# Patient Record
Sex: FEMALE | Employment: FULL TIME | URBAN - METROPOLITAN AREA
[De-identification: names, ages, dates, MRNs, and addresses within clinical notes are randomized per-mention and may not be internally consistent; named-entity substitution may affect disease eponyms.]

---

## 2019-06-11 ENCOUNTER — TELEPHONE (OUTPATIENT)
Dept: OBGYN CLINIC | Facility: CLINIC | Age: 29
End: 2019-06-11

## 2019-06-11 NOTE — TELEPHONE ENCOUNTER
Patient calling to initiate prenatal care  LMP 5/3/19  Patient is 7-8 weeks on 6/28/19  Confirmation Ultrasound and Appointment scheduled on     Future Appointments   Date Time Provider Manjeet Lui   7/2/2019  2:30 PM [facility] OB PFLD US [facility] OB/GYN P [facility]

## 2019-06-12 NOTE — TELEPHONE ENCOUNTER
Meds: PNV  PMH: None  PSH: None  Denies any questions/concerns. States she had post partum hemorrhage with 1st.  Denies any further questions or concerns.

## 2019-07-02 ENCOUNTER — ULTRASOUND ENCOUNTER (OUTPATIENT)
Dept: OBGYN CLINIC | Facility: CLINIC | Age: 29
End: 2019-07-02
Payer: COMMERCIAL

## 2019-07-02 ENCOUNTER — OFFICE VISIT (OUTPATIENT)
Dept: OBGYN CLINIC | Facility: CLINIC | Age: 29
End: 2019-07-02
Payer: COMMERCIAL

## 2019-07-02 VITALS
HEART RATE: 85 BPM | WEIGHT: 119.81 LBS | DIASTOLIC BLOOD PRESSURE: 66 MMHG | HEIGHT: 64 IN | BODY MASS INDEX: 20.45 KG/M2 | SYSTOLIC BLOOD PRESSURE: 108 MMHG

## 2019-07-02 DIAGNOSIS — N91.1 AMENORRHEA, SECONDARY: Primary | ICD-10-CM

## 2019-07-02 PROCEDURE — 76856 US EXAM PELVIC COMPLETE: CPT | Performed by: OBSTETRICS & GYNECOLOGY

## 2019-07-02 PROCEDURE — 99213 OFFICE O/P EST LOW 20 MIN: CPT | Performed by: OBSTETRICS & GYNECOLOGY

## 2019-07-02 NOTE — PROGRESS NOTES
Here for amenorrhea secondary to positive pregnancy test  Pt had ultrasound today showing single live IUP  Pt having N/V    PTL around 20 weeks. Was using vaginal progesterone. Delivered FT     Patient's last menstrual period was 05/04/2019. Lisset Quiñones      Last pap

## 2019-07-30 NOTE — PROGRESS NOTES
[facility]  Obstetrics and Gynecology  History & Physical    CC: Patient is here to establish prenatal care     Subjective:     HPI:  Joane Krabbe is a 34year old  female at 12w4d who presents today to establish prenatal care.  Patient rep History   Problem Relation Age of Onset   • Heart Disorder Maternal Grandmother    • Hypertension Maternal Grandmother    • Heart Disorder Maternal Grandfather    • Hypertension Maternal Grandfather    • Diabetes Paternal Grandmother    • Hypertension [place] closed. No bleeding noted. 4) Uterus: Anteverted. Mobile. 12 week gestational size. 5) Adnexa/parametria: Non tender adnexa. No masses. Fetal Well Being Assessment:   BPM. Putnam, live IUP.      Assessment/Plan:     Lorna Albright is a 34 ye

## 2019-07-30 NOTE — PATIENT INSTRUCTIONS
Genetic screening     1) First trimester screening at 13 weeks includes blood test and ultrasound. You will need to schedule an appointment with maternal fetal medicine office. Therefore, you will need time to make appointment.      2) cell free fetal DNA ( Headache or Mild aches and pain: Extra Strength Tylenol     Gas: Gas X, Gelusil, Papaya Tablets, Maalox, Mylicon or Mylanta Gas    Heartburn: Tums, Mylanta, Pepcid AC, Zantac 75 or Maalox    Sore throat: Alcohol free Chloraseptic spray or Lozenges     Saniaus

## 2019-07-31 ENCOUNTER — INITIAL PRENATAL (OUTPATIENT)
Dept: OBGYN CLINIC | Facility: CLINIC | Age: 29
End: 2019-07-31
Payer: COMMERCIAL

## 2019-07-31 ENCOUNTER — LAB ENCOUNTER (OUTPATIENT)
Dept: LAB | Age: 29
End: 2019-07-31
Attending: OBSTETRICS & GYNECOLOGY
Payer: COMMERCIAL

## 2019-07-31 VITALS
DIASTOLIC BLOOD PRESSURE: 70 MMHG | SYSTOLIC BLOOD PRESSURE: 108 MMHG | BODY MASS INDEX: 20.55 KG/M2 | HEART RATE: 78 BPM | HEIGHT: 64 IN | WEIGHT: 120.38 LBS

## 2019-07-31 DIAGNOSIS — Z13.79 GENETIC SCREENING: ICD-10-CM

## 2019-07-31 DIAGNOSIS — Z34.91 ENCOUNTER FOR SUPERVISION OF NORMAL PREGNANCY IN FIRST TRIMESTER, UNSPECIFIED GRAVIDITY: Primary | ICD-10-CM

## 2019-07-31 DIAGNOSIS — Z36.9 PRENATAL SCREENING ENCOUNTER: ICD-10-CM

## 2019-07-31 DIAGNOSIS — Z12.4 CERVICAL CANCER SCREENING: ICD-10-CM

## 2019-07-31 DIAGNOSIS — Z34.91 ENCOUNTER FOR SUPERVISION OF NORMAL PREGNANCY IN FIRST TRIMESTER, UNSPECIFIED GRAVIDITY: ICD-10-CM

## 2019-07-31 LAB
ANTIBODY SCREEN: NEGATIVE
BASOPHILS # BLD AUTO: 0.04 X10(3) UL (ref 0–0.2)
BASOPHILS NFR BLD AUTO: 0.5 %
DEPRECATED RDW RBC AUTO: 42.5 FL (ref 35.1–46.3)
EOSINOPHIL # BLD AUTO: 0.06 X10(3) UL (ref 0–0.7)
EOSINOPHIL NFR BLD AUTO: 0.7 %
ERYTHROCYTE [DISTWIDTH] IN BLOOD BY AUTOMATED COUNT: 13.9 % (ref 11–15)
HBV SURFACE AG SER-ACNC: 0.2 [IU]/L
HBV SURFACE AG SERPL QL IA: NONREACTIVE
HCT VFR BLD AUTO: 37.7 % (ref 35–48)
HGB BLD-MCNC: 12.8 G/DL (ref 12–16)
IMM GRANULOCYTES # BLD AUTO: 0.04 X10(3) UL (ref 0–1)
IMM GRANULOCYTES NFR BLD: 0.5 %
LYMPHOCYTES # BLD AUTO: 1.56 X10(3) UL (ref 1–4)
LYMPHOCYTES NFR BLD AUTO: 19.1 %
MCH RBC QN AUTO: 28.4 PG (ref 26–34)
MCHC RBC AUTO-ENTMCNC: 34 G/DL (ref 31–37)
MCV RBC AUTO: 83.8 FL (ref 80–100)
MONOCYTES # BLD AUTO: 0.47 X10(3) UL (ref 0.1–1)
MONOCYTES NFR BLD AUTO: 5.8 %
MULTISTIX LOT#: NORMAL NUMERIC
NEUTROPHILS # BLD AUTO: 6 X10 (3) UL (ref 1.5–7.7)
NEUTROPHILS # BLD AUTO: 6 X10(3) UL (ref 1.5–7.7)
NEUTROPHILS NFR BLD AUTO: 73.4 %
PLATELET # BLD AUTO: 236 10(3)UL (ref 150–450)
RBC # BLD AUTO: 4.5 X10(6)UL (ref 3.8–5.3)
RH BLOOD TYPE: POSITIVE
RUBV IGG SER QL: POSITIVE
RUBV IGG SER-ACNC: 66.4 IU/ML (ref 10–?)
T PALLIDUM AB SER QL IA: NONREACTIVE
WBC # BLD AUTO: 8.2 X10(3) UL (ref 4–11)

## 2019-07-31 PROCEDURE — 86762 RUBELLA ANTIBODY: CPT

## 2019-07-31 PROCEDURE — 81002 URINALYSIS NONAUTO W/O SCOPE: CPT | Performed by: OBSTETRICS & GYNECOLOGY

## 2019-07-31 PROCEDURE — 87591 N.GONORRHOEAE DNA AMP PROB: CPT | Performed by: OBSTETRICS & GYNECOLOGY

## 2019-07-31 PROCEDURE — 87086 URINE CULTURE/COLONY COUNT: CPT | Performed by: OBSTETRICS & GYNECOLOGY

## 2019-07-31 PROCEDURE — 86850 RBC ANTIBODY SCREEN: CPT

## 2019-07-31 PROCEDURE — 86780 TREPONEMA PALLIDUM: CPT

## 2019-07-31 PROCEDURE — 88175 CYTOPATH C/V AUTO FLUID REDO: CPT | Performed by: OBSTETRICS & GYNECOLOGY

## 2019-07-31 PROCEDURE — 86900 BLOOD TYPING SEROLOGIC ABO: CPT

## 2019-07-31 PROCEDURE — 87389 HIV-1 AG W/HIV-1&-2 AB AG IA: CPT

## 2019-07-31 PROCEDURE — 36415 COLL VENOUS BLD VENIPUNCTURE: CPT

## 2019-07-31 PROCEDURE — 87340 HEPATITIS B SURFACE AG IA: CPT

## 2019-07-31 PROCEDURE — 85025 COMPLETE CBC W/AUTO DIFF WBC: CPT

## 2019-07-31 PROCEDURE — 87491 CHLMYD TRACH DNA AMP PROBE: CPT | Performed by: OBSTETRICS & GYNECOLOGY

## 2019-07-31 PROCEDURE — 86901 BLOOD TYPING SEROLOGIC RH(D): CPT

## 2019-08-01 LAB
C TRACH DNA SPEC QL NAA+PROBE: NEGATIVE
N GONORRHOEA DNA SPEC QL NAA+PROBE: NEGATIVE

## 2019-08-05 ENCOUNTER — TELEPHONE (OUTPATIENT)
Dept: OBGYN CLINIC | Facility: CLINIC | Age: 29
End: 2019-08-05

## 2019-08-05 LAB
LAST PAP RESULT: NORMAL
PAP HISTORY (OTHER THAN LAST PAP): NORMAL

## 2019-08-06 NOTE — TELEPHONE ENCOUNTER
Normal MaterniT 21, consistent with female fetus FYI. I will leave for RN to call patient in the morning.

## 2019-08-10 NOTE — TELEPHONE ENCOUNTER
Patient called this morning. I let her know that Diego Winters states that the [id number] test came back normal.  Patient did want to know male or female. I also gave the patient that information also, letting her know it is a female fetus.

## 2019-08-26 ENCOUNTER — ROUTINE PRENATAL (OUTPATIENT)
Dept: OBGYN CLINIC | Facility: CLINIC | Age: 29
End: 2019-08-26
Payer: COMMERCIAL

## 2019-08-26 VITALS — SYSTOLIC BLOOD PRESSURE: 112 MMHG | DIASTOLIC BLOOD PRESSURE: 76 MMHG | BODY MASS INDEX: 21 KG/M2 | WEIGHT: 124.19 LBS

## 2019-08-26 DIAGNOSIS — Z36.9 PRENATAL SCREENING ENCOUNTER: Primary | ICD-10-CM

## 2019-08-26 LAB
MULTISTIX EXPIRATION DATE: NORMAL DATE
MULTISTIX LOT#: NORMAL NUMERIC

## 2019-08-26 PROCEDURE — 81002 URINALYSIS NONAUTO W/O SCOPE: CPT | Performed by: OBSTETRICS & GYNECOLOGY

## 2019-08-26 NOTE — PROGRESS NOTES
NATHEN.   Doing well. No complaints. Denies abdominal pain or vaginal bleeding. Some pressure in pelvic bone after 12 hour shifts.    Rh +   Genetic screening - neg cfDNA - female   Recommend Anatomy scan 20 wks   Prenatal labs reviewed     RTC in 4 weeks

## 2019-09-24 ENCOUNTER — ULTRASOUND ENCOUNTER (OUTPATIENT)
Dept: OBGYN CLINIC | Facility: CLINIC | Age: 29
End: 2019-09-24
Payer: COMMERCIAL

## 2019-09-24 ENCOUNTER — ROUTINE PRENATAL (OUTPATIENT)
Dept: OBGYN CLINIC | Facility: CLINIC | Age: 29
End: 2019-09-24
Payer: COMMERCIAL

## 2019-09-24 VITALS — DIASTOLIC BLOOD PRESSURE: 64 MMHG | BODY MASS INDEX: 22 KG/M2 | SYSTOLIC BLOOD PRESSURE: 110 MMHG | WEIGHT: 128.63 LBS

## 2019-09-24 DIAGNOSIS — O26.899 PELVIC PAIN DURING PREGNANCY: ICD-10-CM

## 2019-09-24 DIAGNOSIS — Z36.89 ENCOUNTER FOR ROUTINE FETAL ULTRASOUND: Primary | ICD-10-CM

## 2019-09-24 DIAGNOSIS — Z34.80 SUPERVISION OF OTHER NORMAL PREGNANCY, ANTEPARTUM: ICD-10-CM

## 2019-09-24 DIAGNOSIS — R10.2 PELVIC PAIN DURING PREGNANCY: ICD-10-CM

## 2019-09-24 DIAGNOSIS — Z36.9 PRENATAL SCREENING ENCOUNTER: ICD-10-CM

## 2019-09-24 LAB
MULTISTIX EXPIRATION DATE: NORMAL DATE
MULTISTIX LOT#: NORMAL NUMERIC

## 2019-09-24 PROCEDURE — 76805 OB US >/= 14 WKS SNGL FETUS: CPT | Performed by: OBSTETRICS & GYNECOLOGY

## 2019-09-24 PROCEDURE — 81002 URINALYSIS NONAUTO W/O SCOPE: CPT | Performed by: OBSTETRICS & GYNECOLOGY

## 2019-09-24 NOTE — PROGRESS NOTES
NATHEN  Patient is doing well. She still complains of constant, dull pain on her pelvic bone. She says it starts in the middle and then migrates to the sides. She notices it more when she comes back home from a long day at work.   She works as a patient car

## 2019-10-22 ENCOUNTER — ROUTINE PRENATAL (OUTPATIENT)
Dept: OBGYN CLINIC | Facility: CLINIC | Age: 29
End: 2019-10-22

## 2019-10-22 ENCOUNTER — TELEPHONE (OUTPATIENT)
Dept: OBGYN CLINIC | Facility: CLINIC | Age: 29
End: 2019-10-22

## 2019-10-22 VITALS — WEIGHT: 133.38 LBS | DIASTOLIC BLOOD PRESSURE: 70 MMHG | SYSTOLIC BLOOD PRESSURE: 118 MMHG | BODY MASS INDEX: 23 KG/M2

## 2019-10-22 DIAGNOSIS — Z34.80 SUPERVISION OF OTHER NORMAL PREGNANCY, ANTEPARTUM: ICD-10-CM

## 2019-10-22 DIAGNOSIS — Z36.9 PRENATAL SCREENING ENCOUNTER: Primary | ICD-10-CM

## 2019-10-22 PROCEDURE — 81002 URINALYSIS NONAUTO W/O SCOPE: CPT | Performed by: NURSE PRACTITIONER

## 2019-10-22 NOTE — PROGRESS NOTES
NATHEN  Doing well, plans to call for PT  FM is good  RH positive  1 hr glucose scheduled for tomorrow  TDAP counseling done  RTC 4wks

## 2019-10-23 ENCOUNTER — LAB ENCOUNTER (OUTPATIENT)
Dept: LAB | Age: 29
End: 2019-10-23
Attending: OBSTETRICS & GYNECOLOGY
Payer: COMMERCIAL

## 2019-10-23 DIAGNOSIS — Z34.80 SUPERVISION OF OTHER NORMAL PREGNANCY, ANTEPARTUM: ICD-10-CM

## 2019-10-23 PROCEDURE — 85025 COMPLETE CBC W/AUTO DIFF WBC: CPT

## 2019-10-23 PROCEDURE — 82950 GLUCOSE TEST: CPT

## 2019-10-23 PROCEDURE — 36415 COLL VENOUS BLD VENIPUNCTURE: CPT

## 2019-10-24 ENCOUNTER — MED REC SCAN ONLY (OUTPATIENT)
Dept: OBGYN CLINIC | Facility: CLINIC | Age: 29
End: 2019-10-24

## 2019-11-19 ENCOUNTER — ROUTINE PRENATAL (OUTPATIENT)
Dept: OBGYN CLINIC | Facility: CLINIC | Age: 29
End: 2019-11-19
Payer: COMMERCIAL

## 2019-11-19 VITALS — DIASTOLIC BLOOD PRESSURE: 70 MMHG | BODY MASS INDEX: 24 KG/M2 | SYSTOLIC BLOOD PRESSURE: 102 MMHG | WEIGHT: 137.63 LBS

## 2019-11-19 DIAGNOSIS — Z3A.28 28 WEEKS GESTATION OF PREGNANCY: Primary | ICD-10-CM

## 2019-11-19 DIAGNOSIS — Z34.80 SUPERVISION OF OTHER NORMAL PREGNANCY, ANTEPARTUM: ICD-10-CM

## 2019-11-19 PROCEDURE — 81002 URINALYSIS NONAUTO W/O SCOPE: CPT | Performed by: OBSTETRICS & GYNECOLOGY

## 2019-11-19 NOTE — PROGRESS NOTES
NATHEN  Doing well  RH +  S/P Anatomy scan nl  TDAP recommended during pregnancy and instructions given-received  RTC 2 wk

## 2019-12-05 ENCOUNTER — ROUTINE PRENATAL (OUTPATIENT)
Dept: OBGYN CLINIC | Facility: CLINIC | Age: 29
End: 2019-12-05
Payer: COMMERCIAL

## 2019-12-05 VITALS — BODY MASS INDEX: 24 KG/M2 | WEIGHT: 137 LBS | DIASTOLIC BLOOD PRESSURE: 72 MMHG | SYSTOLIC BLOOD PRESSURE: 104 MMHG

## 2019-12-05 DIAGNOSIS — Z34.80 SUPERVISION OF OTHER NORMAL PREGNANCY, ANTEPARTUM: Primary | ICD-10-CM

## 2019-12-05 DIAGNOSIS — Z36.9 PRENATAL SCREENING ENCOUNTER: ICD-10-CM

## 2019-12-05 PROCEDURE — 90471 IMMUNIZATION ADMIN: CPT | Performed by: OBSTETRICS & GYNECOLOGY

## 2019-12-05 PROCEDURE — 90715 TDAP VACCINE 7 YRS/> IM: CPT | Performed by: OBSTETRICS & GYNECOLOGY

## 2019-12-05 PROCEDURE — 81002 URINALYSIS NONAUTO W/O SCOPE: CPT | Performed by: OBSTETRICS & GYNECOLOGY

## 2019-12-05 NOTE — PROGRESS NOTES
NATHEN  Doing well, +FM  No contractions  No LOF, VB     Patient Active Problem List:     Encounter for supervision of normal pregnancy, antepartum        1. FHT-Present  2. PNL:  HIV ordered  3. Mode of delivery:  anticipated   4.  Immunizations: reviewed

## 2019-12-08 ENCOUNTER — APPOINTMENT (OUTPATIENT)
Dept: ULTRASOUND IMAGING | Facility: HOSPITAL | Age: 29
DRG: 833 | End: 2019-12-08
Attending: OBSTETRICS & GYNECOLOGY
Payer: COMMERCIAL

## 2019-12-08 ENCOUNTER — HOSPITAL ENCOUNTER (INPATIENT)
Facility: HOSPITAL | Age: 29
LOS: 9 days | Discharge: HOME | DRG: 833 | End: 2019-12-17
Attending: OBSTETRICS & GYNECOLOGY | Admitting: OBSTETRICS & GYNECOLOGY
Payer: COMMERCIAL

## 2019-12-08 PROBLEM — Z34.90 PREGNANCY: Status: ACTIVE | Noted: 2019-12-08

## 2019-12-08 PROBLEM — Z34.90 PREGNANCY (HCC): Status: ACTIVE | Noted: 2019-12-08

## 2019-12-08 PROCEDURE — 76811 OB US DETAILED SNGL FETUS: CPT | Performed by: OBSTETRICS & GYNECOLOGY

## 2019-12-08 RX ORDER — CALCIUM CARBONATE 200(500)MG
1000 TABLET,CHEWABLE ORAL
Status: DISCONTINUED | OUTPATIENT
Start: 2019-12-08 | End: 2019-12-17

## 2019-12-08 RX ORDER — INDOMETHACIN 50 MG/1
50 CAPSULE ORAL ONCE
Status: COMPLETED | OUTPATIENT
Start: 2019-12-08 | End: 2019-12-08

## 2019-12-08 RX ORDER — AZITHROMYCIN 250 MG/1
250 TABLET, FILM COATED ORAL DAILY
Status: COMPLETED | OUTPATIENT
Start: 2019-12-09 | End: 2019-12-13

## 2019-12-08 RX ORDER — MELATONIN
325
Status: ON HOLD | COMMUNITY
End: 2020-02-06

## 2019-12-08 RX ORDER — SODIUM CHLORIDE, SODIUM LACTATE, POTASSIUM CHLORIDE, CALCIUM CHLORIDE 600; 310; 30; 20 MG/100ML; MG/100ML; MG/100ML; MG/100ML
INJECTION, SOLUTION INTRAVENOUS CONTINUOUS
Status: DISCONTINUED | OUTPATIENT
Start: 2019-12-08 | End: 2019-12-13

## 2019-12-08 RX ORDER — INDOMETHACIN 25 MG/1
25 CAPSULE ORAL EVERY 6 HOURS
Status: DISCONTINUED | OUTPATIENT
Start: 2019-12-09 | End: 2019-12-10

## 2019-12-08 RX ORDER — CALCIUM GLUCONATE 94 MG/ML
1 INJECTION, SOLUTION INTRAVENOUS ONCE AS NEEDED
Status: DISCONTINUED | OUTPATIENT
Start: 2019-12-08 | End: 2019-12-09

## 2019-12-08 RX ORDER — ZOLPIDEM TARTRATE 5 MG/1
5 TABLET ORAL NIGHTLY PRN
Status: DISCONTINUED | OUTPATIENT
Start: 2019-12-08 | End: 2019-12-17

## 2019-12-08 RX ORDER — ACETAMINOPHEN 500 MG
1000 TABLET ORAL EVERY 6 HOURS PRN
Status: ON HOLD | COMMUNITY
End: 2020-02-04

## 2019-12-08 RX ORDER — AMOXICILLIN 500 MG/1
500 CAPSULE ORAL EVERY 8 HOURS SCHEDULED
Status: COMPLETED | OUTPATIENT
Start: 2019-12-10 | End: 2019-12-14

## 2019-12-08 RX ORDER — ACETAMINOPHEN 500 MG
1000 TABLET ORAL EVERY 6 HOURS PRN
Status: DISCONTINUED | OUTPATIENT
Start: 2019-12-08 | End: 2019-12-17

## 2019-12-08 RX ORDER — DOCUSATE SODIUM 100 MG/1
100 CAPSULE, LIQUID FILLED ORAL 2 TIMES DAILY
Status: DISCONTINUED | OUTPATIENT
Start: 2019-12-08 | End: 2019-12-17

## 2019-12-08 RX ORDER — BETAMETHASONE SODIUM PHOSPHATE AND BETAMETHASONE ACETATE 3; 3 MG/ML; MG/ML
12 INJECTION, SUSPENSION INTRA-ARTICULAR; INTRALESIONAL; INTRAMUSCULAR; SOFT TISSUE EVERY 24 HOURS
Status: COMPLETED | OUTPATIENT
Start: 2019-12-08 | End: 2019-12-09

## 2019-12-08 RX ORDER — ACETAMINOPHEN 500 MG
500 TABLET ORAL EVERY 6 HOURS PRN
Status: DISCONTINUED | OUTPATIENT
Start: 2019-12-08 | End: 2019-12-17

## 2019-12-09 PROCEDURE — 99232 SBSQ HOSP IP/OBS MODERATE 35: CPT | Performed by: OBSTETRICS & GYNECOLOGY

## 2019-12-09 PROCEDURE — 99221 1ST HOSP IP/OBS SF/LOW 40: CPT | Performed by: OBSTETRICS & GYNECOLOGY

## 2019-12-09 NOTE — H&P
[facility]  Obstetrics and Gynecology  History & Physical    Neptali Montanez Patient Status:  Inpatient    10/25/1990 MRN [id number]   [facility] None Pcp     CC: P Laterality Date   • COLPOSCOPY, CERVIX W/UPPER ADJACENT VAGINA; W/BIOPSY(S), CERVIX     • D&C AFTER DELIVERY  2011    pp hemorrhage   • LASER SURGERY OF CERVIX       Family History:   Family History   Problem Relation Age of Onset   • Heart Disorder Matern Blood Type:   Lab Results   Component Value Date    ABO O 12/08/2019    RH Positive 12/08/2019     GBS:  Unknown      Inpatient labs:  Lab Results   Component Value Date    WBC 7.5 12/08/2019    HGB 12.2 12/08/2019    HCT 36.0 12/08/2019    .0 12/ Amnisure was not collected on admission due to findings at time of exam reported. See further notes for more details.     Desiree Gandara, 12/14/19, 8:00 AM

## 2019-12-09 NOTE — PROGRESS NOTES
[facility]  Obstetrics and Gynecology    OB/GYN: Intrapartum Progress Note     SUBJECTIVE:  Patient is a 34year old  female at 31w2d admitted for PPROM. Patient reports felling well. Small amount of leakage, clear in color.  No pain or con

## 2019-12-09 NOTE — PLAN OF CARE
Problem: ANTEPARTUM/LABOR and DELIVERY  Goal: Maintain pregnancy as long as maternal and/or fetal condition is stable  Description  INTERVENTIONS:  - Maternal surveillance  - Fetal surveillance  - Monitor uterine activity  - Medications as ordered  - Bed Care Plan goals for specific interventions    Outcome: Progressing

## 2019-12-09 NOTE — PROGRESS NOTES
to triage 4 reporting multiple gushes of fluid since 1930. (Patient states fluid was running down legs and soaked her pants. Occasional gushes and trickles of fluid since then). 31 & 1/7 weeks gestation. Denies complications with the pregnancy.   H

## 2019-12-09 NOTE — PROGRESS NOTES
Received pt from [facility] in stable condition. Plan of care discussed with pt. Support person at bedside.

## 2019-12-09 NOTE — PAYOR COMM NOTE
--------------  ADMISSION REVIEW     Payor: Good WAY [facility] #:  [id number]  Authorization Number: [id number]    Admit date: 12/8/19  Admit time: 2022       Admitting Physician: Janae Nguyen MD  Attending Physician:  Uday Keller Delivery Anes PTL Lv   3 Current            2 Term 09/20/14 39w3d 15:25 / 00:29 7 lb 10.1 oz (3.46 kg) M NORMAL SPONT EPI Y WILLIE      Complications: Meconium   1 Term 04/11/11 38w0d  8 lb 2 oz (3.685 kg) M NORMAL SPONT  Y WILLIE      Birth Comments: Post Part Extremities: negative edema bilaterally, negative calf tenderness bilaterally, Sharon's sign negative bilaterally     FHT: moderate variability/130 BPM / Positive accelerations/Negative decelerations   TOCO: irregular UC's now, previously every 4-5 min alternatives and possible complications have been discussed in detail with the patient. Pre-admission, admission, and post admission procedures and expectations were discussed in detail.   All questions answered, all appropriate consents will be signed at  Please contact neonatology service if further questions or concerns arise.    Humble Richard MD  12/9/2019  5:35 PM        MEDICATIONS ADMINISTERED IN LAST 1 DAY:  Ampicillin Sodium (OMNIPEN) 2 g in sodium chloride 0.9% 100 mL IVPB-minibag/add-vantage magnesium sulfate 40mg/ml infusion     Date Action Dose Route User    12/9/2019 0655 New Bag 2 g/hr Intravenous Kristal Levi RN    12/8/2019 2215 New Bag 2 g/hr Intravenous Camilool CHRISTIANO Levi      MAGNESIUM SULFATE BOLUS FROM BAG 4 g infusion     Date A

## 2019-12-09 NOTE — CONSULTS
Neonatology Consult  I met Unique Pringle in  consult because of possible delivery at 32 2/7 wks. Her parents and brother were present in her  room.  Pt was admitted on 19 after PROM at [address] [place] [address].   Pt is 34 y.o.   O positive, rubella immun

## 2019-12-10 ENCOUNTER — TELEPHONE (OUTPATIENT)
Dept: OBGYN CLINIC | Facility: CLINIC | Age: 29
End: 2019-12-10

## 2019-12-10 PROCEDURE — 99232 SBSQ HOSP IP/OBS MODERATE 35: CPT | Performed by: OBSTETRICS & GYNECOLOGY

## 2019-12-10 RX ORDER — CHOLECALCIFEROL (VITAMIN D3) 25 MCG
1 TABLET,CHEWABLE ORAL DAILY
Status: DISCONTINUED | OUTPATIENT
Start: 2019-12-10 | End: 2019-12-17

## 2019-12-10 RX ORDER — SODIUM CHLORIDE 0.9 % (FLUSH) 0.9 %
5 SYRINGE (ML) INJECTION EVERY 8 HOURS
Status: DISCONTINUED | OUTPATIENT
Start: 2019-12-10 | End: 2019-12-13

## 2019-12-10 NOTE — CM/SW NOTE
Team rounds done on patient. Team reviewed patient orders, patient plan of care, and any possible discharge needs. Team present: LAYO Allison; Mariana Mcintyre, CHRISTIANO CM; and RN caring for patient.

## 2019-12-10 NOTE — CM/SW NOTE
12/10/19 1400   Referral Data   Referral Source Self referral   Referral Reason Counseling/support;Psychosocial assessment     SW met with pt to offer support due to her antepartum admission. Pt is expected to stay until delivery.  SW reviewed chart and

## 2019-12-10 NOTE — TELEPHONE ENCOUNTER
Dr. Jason Michelle contacted me regarding patient. She needs us to fax a letter to her work stating that she is in the hospital for PROM and needs a note faxed to her work. Note generated and faxed to [phone].

## 2019-12-10 NOTE — CONSULTS
[address] Patient Status:  Inpatient    10/25/1990 MRN [id number]   Location [address] [place] [address] Attending Janae Nguyen MD   Hosp Day # 2 PCP None Pcp         SUBJECTIVE:  Reason for Admission with D&C      Complications:  Other - see comments             OBJECTIVE:  Temp:  [98 °F (36.7 °C)-99 °F (37.2 °C)] 98.1 °F (36.7 °C)  Pulse:  [] 107  Resp:  [16] 16  BP: ()/(50-69) 106/58    Intake/Output Summary (Last 24 hours) at 12/10/2019 1

## 2019-12-10 NOTE — CONSULTS
[address] Patient Status:  Inpatient    10/25/1990 MRN [id number]   Location [address] [place] [address] Attending Luis Michael MD   Hosp Day # 1 PCP None Pcp     Late entry-- consult done 19 comments         Social History:  Social History    Tobacco Use      Smoking status: Former Smoker        Years: 10.00      Smokeless tobacco: Never Used    Alcohol use: No       Review of Systems:  Unremarkable except as above    OBJECTIVE:  Temp:  [97.6 fetus and  are at greater risk of PPROM-related morbidity and mortality than the mother. The majority of pregnancies with PPROM deliver  and within one week of membrane rupture.   infants are especially vulnerable to a variety of proble

## 2019-12-10 NOTE — PAYOR COMM NOTE
--------------  CONTINUED STAY REVIEW    Payor: Good WAY [facility] #:  [id number]  Authorization Number: [id number]    Admit date: 12/8/19  Admit time: 2022    Admitting Physician: Maribell Bell MD  Attending Physician:  Samanta Arana complications.     Medications:  Prescriptions Prior to Admission   ferrous sulfate 325 (65 FE) MG Oral Tab EC, Take 325 mg by mouth daily with breakfast., Disp: , Rfl: , 12/8/2019 at Unknown time  acetaminophen 500 MG Oral Tab, Take 1,000 mg by mouth ever soft, non-tender; bowel sounds normal; no masses,  no organomegaly     Extremities: extremities normal, atraumatic, no cyanosis or edema  Neurologic: Grossly normal     We discussed the following:     We discussed the morbidity and mortality associated wit percent of pregnancies complicated by PPROM.           Giving  glucocorticoids to patients with PROM < 32 weeks has shown  death, RDS, IVH, NEC, and duration of  respiratory support were significantly reduced, without an increase in CHRISTIANO Duncan      docusate sodium (COLACE) cap 100 mg     Date Action Dose Route User    12/10/2019 0823 Given 100 mg Oral Aubrey Martínez RN    12/9/2019 2132 Given 100 mg Oral Jody Dos Santos RN      indomethacin (INDOCIN) cap 25 mg     Date Action D

## 2019-12-10 NOTE — PROGRESS NOTES
[facility]  Obstetrics and Gynecology    OB/GYN: Intrapartum Progress Note     SUBJECTIVE:  Patient is a 34year old  female at [address] admitted for PPROM. Patient reports felling well. Small amount of leakage, clear in color.  No pain or con

## 2019-12-11 PROCEDURE — 99232 SBSQ HOSP IP/OBS MODERATE 35: CPT | Performed by: OBSTETRICS & GYNECOLOGY

## 2019-12-11 NOTE — PROGRESS NOTES
31w4d  No C/O  Afebrile, VS stable  Good FM  No contractions  Leaking  Abdomen soft, non-tender  CPM

## 2019-12-11 NOTE — PROGRESS NOTES
Patient resting in bed. Pt reports clear fluid over night. Pt denies and bleeding or cramping. POC discussed. All questions answered. Pt verbalizes understanding.

## 2019-12-12 PROCEDURE — 99232 SBSQ HOSP IP/OBS MODERATE 35: CPT | Performed by: OBSTETRICS & GYNECOLOGY

## 2019-12-12 NOTE — TELEPHONE ENCOUNTER
Received from [facility] attending Physician's statement.  Placed in nurse bin in [place]  for completion

## 2019-12-12 NOTE — PAYOR COMM NOTE
--------------  CONTINUED STAY REVIEW    Payor: Good WAY [facility] #:  [id number]  Authorization Number: [id number]    Admit date: 12/8/19  Admit time: 2022    Admitting Physician: Mohini Carranza MD  Attending Physician:  Billy Silvestre  premature rupture of membranes (PPROM) with unknown onset of labor        Continue in house management  On oral antibiotics  Delivery at 34 wks   Jerrica Toledo MD   2019  8:29 AM       MEDICATIONS ADMINISTERED IN LAST 1 DAY:      Freq: Every 6 hours Route: IV  Last Dose: Stopped (12/08/19 2250)  Start: 12/08/19 2245 End: 12/08/19 2245    Order specific questions:   What infection is this being used to treat?  Premature rupture of membranes  What is the anticipated duration of therap Dose: 50 mg  Freq: Once Route: OR  Start: 12/08/19 2130 End: 12/08/19 2239        MAGNESIUM SULFATE BOLUS FROM BAG 4 g infusion   Dose: 4 g  Freq:  Once Route: IV  Start: 12/08/19 2130 End: 12/08/19 2215        Followed by  magnesium sulfate 40mg/ml infusio

## 2019-12-12 NOTE — PROGRESS NOTES
[facility]  OB/GYN: Antepartum Progress Note     SUBJECTIVE:  Pt is a 34year old  female at 31w5d who was admitted on 2019 for pprom. Fetal Movement: +. Vaginal Bleeding: none. Contractions: none.  Leakage of Fluid: +    OBJECTIVE:

## 2019-12-12 NOTE — PROGRESS NOTES
Pt resting comfortably in bed, pt states has intermittent leaking of clear fluid, denies cramping or bleeding at this time

## 2019-12-12 NOTE — PLAN OF CARE
Problem: ANTEPARTUM/LABOR and DELIVERY  Goal: Maintain pregnancy as long as maternal and/or fetal condition is stable  Description  INTERVENTIONS:  - Maternal surveillance  - Fetal surveillance  - Monitor uterine activity  - Medications as ordered  - Bed Care Plan goals for specific interventions    Outcome: Progressing     Problem: ANXIETY  Goal: Will report anxiety at manageable levels  Description  INTERVENTIONS:  - Administer medication as ordered  - Teach and rehearse alternative coping skills  - Prov

## 2019-12-13 PROCEDURE — 99231 SBSQ HOSP IP/OBS SF/LOW 25: CPT | Performed by: OBSTETRICS & GYNECOLOGY

## 2019-12-13 RX ORDER — SODIUM CHLORIDE, SODIUM LACTATE, POTASSIUM CHLORIDE, CALCIUM CHLORIDE 600; 310; 30; 20 MG/100ML; MG/100ML; MG/100ML; MG/100ML
INJECTION, SOLUTION INTRAVENOUS CONTINUOUS
Status: DISCONTINUED | OUTPATIENT
Start: 2019-12-13 | End: 2019-12-14

## 2019-12-13 NOTE — PLAN OF CARE
Problem: ANTEPARTUM/LABOR and DELIVERY  Goal: Maintain pregnancy as long as maternal and/or fetal condition is stable  Description  INTERVENTIONS:  - Maternal surveillance  - Fetal surveillance  - Monitor uterine activity  - Medications as ordered  - Bed Care Plan goals for specific interventions    Outcome: Progressing     Problem: ANXIETY  Goal: Will report anxiety at manageable levels  Description  INTERVENTIONS:  - Administer medication as ordered  - Teach and rehearse alternative coping skills  - Prov no

## 2019-12-13 NOTE — PROGRESS NOTES
ROM + collected per this RN. Pt arianna well. No fluid noted in vagina. Pt had large gush of bright red blood prior to taking swab out of vagina. Pericare done. Dr Harkins Smoker at bedside at this time and notified of vaginal bleeding.  Pt instructed to call if she ha

## 2019-12-13 NOTE — PROGRESS NOTES
[facility]  Progress Note    Azul De Patient Status:  Inpatient    10/25/1990 MRN [id number]   Location [facility] Attending Renetta Fuller MD   [facility] Day # 5 PCP None Pcp     Subjective:  Azul De is a(n) 34 year ol tocolysis for BMTZ only  Will continue to presume pPROM for now  I suspect since she had no bleeding prior to exam, that her cervix is dilated/friable and that is likely where bleeding originated from  Continue to monitor  Currently on NST    [facility]

## 2019-12-13 NOTE — DIETARY NOTE
[address] [address]     Admitting diagnosis:  preg state  Pregnancy  Pregnancy  Pregnancy    Ht: 162.6 cm (5' 4\")  Wt: 62.1 kg (137 lb). Body mass index is 23.52 kg/m².     Labs/Meds reviewed    Diet: Orders Placed This E

## 2019-12-13 NOTE — CONSULTS
[facility]    Maternal Fetal Medicine Progress Note    Maame Soto Patient Status:  Inpatient    10/25/1990 MRN [id number]   Location [facility] Attending Yulissa Dave MD   Hosp Day # 5 PCP None Pcp     SUBJECTIVE:  She D

## 2019-12-13 NOTE — IMAGING NOTE
History: Age: 34 years.  : 3 Para: 2.  ____________________________________________________________________________  Dating:  LMP: 2019 EDC: 2020 GA by LMP: 31w6d  __________________________________________________________________________

## 2019-12-14 PROCEDURE — 99231 SBSQ HOSP IP/OBS SF/LOW 25: CPT | Performed by: OBSTETRICS & GYNECOLOGY

## 2019-12-14 NOTE — PROGRESS NOTES
[facility]  Progress Note    Connie Bahena Patient Status:  Inpatient    10/25/1990 MRN [id number]   Location [facility] [facility] Attending Janae Nguyen MD   [facility] Day # 6 PCP None Pcp     Subjective:  Connie Bahena is a(n) 34 year ol normal MEET of 12 and she reports no LOF in 3 days. Yesterday attempt at [facility] resulted in significant bleeding from friable cervix and now irritability overnight. Will discuss later and consider maybe holding off on repeat amnisure until tomorrow.    S/p

## 2019-12-14 NOTE — PROGRESS NOTES
Rec'd report per Shyam Foote. Called to pt room for beeping IV pump. Pt resting comfortably in bed watching TV. Denies discomforts or concerns at this time, reports \"haven't felt a contraction in a while\".  EFM adjusted, abdomen soft, non-tender to palpation

## 2019-12-15 PROCEDURE — 99232 SBSQ HOSP IP/OBS MODERATE 35: CPT | Performed by: OBSTETRICS & GYNECOLOGY

## 2019-12-15 RX ORDER — SODIUM CHLORIDE, SODIUM LACTATE, POTASSIUM CHLORIDE, CALCIUM CHLORIDE 600; 310; 30; 20 MG/100ML; MG/100ML; MG/100ML; MG/100ML
INJECTION, SOLUTION INTRAVENOUS CONTINUOUS
Status: DISCONTINUED | OUTPATIENT
Start: 2019-12-15 | End: 2019-12-17

## 2019-12-15 NOTE — PROGRESS NOTES
Pt denies having any vaginal leaking since Wednesday 12/11. Denies any vaginal bleeding today. +FM per pt. Pt states feels occasional cramping \"but nothing different over the past few days). Abdomen palpates soft and nontender.  Denies any needs or concern

## 2019-12-15 NOTE — PROGRESS NOTES
[facility]  OB/GYN: Antepartum Progress Note     SUBJECTIVE:  Pt is a 34year old  female at 32w1d who was admitted on 2019 for PPROM. Fetal Movement: normal. Vaginal Bleeding: negative. Contractions: negative.  Leakage of Fluid: no - completed Latency antibiotics  - no Tocolysis  - LABS: CBC, Type and Screen every 3 days   - IVF: saline lock   - Activity: Bedrest, bathroom privileges   - M consultation obtained and recommendation made for admission and continued inpatient monitor

## 2019-12-16 PROCEDURE — 99231 SBSQ HOSP IP/OBS SF/LOW 25: CPT | Performed by: OBSTETRICS & GYNECOLOGY

## 2019-12-16 NOTE — PROGRESS NOTES
2200-PATIENT RATES PAIN A 3 CONSTANT WITH THE IRRITABILITY AND THEN WHEN SHE HAS A CONTRACTION RATES PAIN A 5

## 2019-12-16 NOTE — PAYOR COMM NOTE
--------------  CONTINUED STAY REVIEW    Payor: Good WAY [facility] #:  [id number]  Authorization Number: [id number]    Admit date: 12/8/19  Admit time: 2022    Admitting Physician: Mala King MD  Attending Physician:  Galen Sanchez 12/12/2019  8:29 AM      12/13/19:  Maternal Fetal Medicine Progress Note  She DENIES continued leakage of fluid. She denies vaginal bleeding or uterine contractions. Fetal movement is active.       Temp:  [97.5 °F (36.4 °C)-98.3 °F (36.8 °C)] 98.3 °F (36 Amniotic fluid: normal. MEET: 12.6 cm. MVP: 3.9 cm. Q1: 3.9 cm. Q2: 2.7 cm. Q3: 2.4 cm. Q4: 3.6 cm. Biophysical Profile: Fetal body movements: normal (2), Fetal tone: normal (2), Fetal breathing movements: normal (2), Amniotic fluid volume: normal (2).  Sc BP: 113/62 118/63   109/57   BP Location: Right arm Other (Comment)       Pulse: 83 80   71   Resp: 16 16   16   Temp: 98.6 °F (37 °C) 98.7 °F (37.1 °C) 98.5 °F (36.9 °C) 98.4 °F (36.9 °C)   TempSrc: Oral Oral Oral Oral        NST reviewed:  FHT: baseline OBJECTIVE:  Vital signs in last 24 hours:  Temp:  [97.6 °F (36.4 °C)-98.8 °F (37.1 °C)] 98 °F (36.7 °C)  Pulse:  [80-90] 90  Resp:  [16-20] 18  BP: ()/(52-58) 113/56  Temps:   Temp Readings from Last 3 Encounters:  12/15/19 : 98 °F (36.7 °C) (Oral) - Recommendation made for delivery at 34wks but will consider delivery sooner if signs/symptoms of infection or fetal distress  - Vitals per routine  - MFM to repeat MEET tomorrow  - recommendation made for ROM plus tomorrow per MFM   - FHT monitoring 1 donaldo What infection is this being used to treat?  Premature rupture of membranes  What is the anticipated duration of therapy? <48 hours           Ampicillin Sodium (OMNIPEN) 2 g in sodium chloride 0.9% 100 mL IVPB-minibag/add-vantage   Dose: 2 g  Freq: Every 6 Please specify which indication (free text - type in box) Premature rupture of membranes  What is the anticipated duration of therapy?  5-7 days    0858-Given        0945-Given             betamethasone sod phos & acet (CELESTONE) 6 (3-3) MG/ML injection 12 Medications 12/12/19 12/13/19 12/14/19 12/15/19 12/16/19   lactated ringers infusion   Rate: 125 mL/hr Freq: Continuous Route: IV  Start: 12/15/19 2015       2011-New Bag        0242-New Bag          lactated ringers infusion   Rate: 125 mL/hr Freq: Contin

## 2019-12-16 NOTE — PROGRESS NOTES
32w2d  No C/O  Uterine irritability overnight, resolved with IV hydration  NST reactive  Baseline 145  Abdomen soft, non tender  CPM

## 2019-12-16 NOTE — PROGRESS NOTES
Pt reports feeling increased tightening/pressure over the past hour, despite voiding. States has also had a BM today. EFM reapplied.  Abdomen palpates soft and nontender

## 2019-12-17 ENCOUNTER — TELEPHONE (OUTPATIENT)
Dept: OBGYN CLINIC | Facility: CLINIC | Age: 29
End: 2019-12-17

## 2019-12-17 ENCOUNTER — MED REC SCAN ONLY (OUTPATIENT)
Dept: OBGYN CLINIC | Facility: CLINIC | Age: 29
End: 2019-12-17

## 2019-12-17 VITALS
RESPIRATION RATE: 16 BRPM | BODY MASS INDEX: 23.39 KG/M2 | TEMPERATURE: 98 F | DIASTOLIC BLOOD PRESSURE: 58 MMHG | WEIGHT: 137 LBS | OXYGEN SATURATION: 98 % | HEIGHT: 64 IN | HEART RATE: 81 BPM | SYSTOLIC BLOOD PRESSURE: 109 MMHG

## 2019-12-17 PROCEDURE — 99238 HOSP IP/OBS DSCHRG MGMT 30/<: CPT | Performed by: OBSTETRICS & GYNECOLOGY

## 2019-12-17 NOTE — TELEPHONE ENCOUNTER
Forms completed. Signed by Dr. Lara Neely.     Faxed with clinicals to the [facility] at [phone]    Copy to scan  Copy to file in [address]

## 2019-12-17 NOTE — DISCHARGE SUMMARY
[facility]  Discharge Summary    Azul De Patient Status:  Inpatient    10/25/1990 MRN [id number]   [facility] 1SW-J Attending Renetta Fuller MD   [facility] Day # 9 PCP None Pcp     Date of Admission: 2019    Date of Discharge:

## 2019-12-17 NOTE — CONSULTS
[facility]    Maternal Fetal Medicine Progress Note    Joane Krabbe Patient Status:  Inpatient    10/25/1990 MRN [id number]   [facility] 1SW-J Attending Anand Armas MD   Hosp Day # 9 PCP None Pcp     SUBJECTIVE:  Patient denies l

## 2019-12-17 NOTE — CM/SW NOTE
Team rounds done on patient. Team reviewed patient orders, patient plan of care, and any possible discharge needs. Team present: ROSA Alcocer; Kimberley Jimenez RN CM; Sahra Vargas, Behavioral Health,and RN caring for patient.

## 2019-12-17 NOTE — PROGRESS NOTES
See note per mfm  Multiple re-evaluations seem neg for pprom    haydee 13  rom + neg x2    [id number] Melissa Currie for dc to home    D/w pt to see week of dec 30 with haydee check.   She is agreeable    Questions answered  ptl precautions

## 2019-12-17 NOTE — PROGRESS NOTES
Pt transferred to room 1107 with her belongings and support person by her side. Pt has no complaints at this time.

## 2019-12-17 NOTE — PROGRESS NOTES
[facility]  OB/GYN: Antepartum Progress Note     SUBJECTIVE:  Pt is a 34year old  female at [address] [facility] who was admitted on 2019 for pprom. Fetal Movement: yes.  Vaginal Bleeding: no. Contractions: no. Leakage of Fluid: yes    OBJECTIVE:

## 2019-12-17 NOTE — PROGRESS NOTES
Pt given all discharge information and pt verbalized understanding to all. Pt is comfortable going home. Pt has next scheduled appt the week of 12/30/19 for haydee check. Pt will call MD with any questions.  Patient discharged per pct via wheelchair in stable

## 2019-12-17 NOTE — IMAGING NOTE
Indication: PPROM.   ____________________________________________________________________________  History: Age: 34 years.  : 3 Para: 2.  ____________________________________________________________________________  Dating:  LMP: 2019 EDC: 02/0

## 2019-12-31 ENCOUNTER — ULTRASOUND ENCOUNTER (OUTPATIENT)
Dept: OBGYN CLINIC | Facility: CLINIC | Age: 29
End: 2019-12-31
Payer: MEDICAID

## 2019-12-31 ENCOUNTER — ROUTINE PRENATAL (OUTPATIENT)
Dept: OBGYN CLINIC | Facility: CLINIC | Age: 29
End: 2019-12-31
Payer: MEDICAID

## 2019-12-31 VITALS
BODY MASS INDEX: 23.9 KG/M2 | WEIGHT: 140 LBS | HEIGHT: 64 IN | SYSTOLIC BLOOD PRESSURE: 98 MMHG | DIASTOLIC BLOOD PRESSURE: 60 MMHG

## 2019-12-31 DIAGNOSIS — Z36.9 PRENATAL SCREENING ENCOUNTER: Primary | ICD-10-CM

## 2019-12-31 PROCEDURE — 81002 URINALYSIS NONAUTO W/O SCOPE: CPT | Performed by: OBSTETRICS & GYNECOLOGY

## 2019-12-31 PROCEDURE — 76816 OB US FOLLOW-UP PER FETUS: CPT | Performed by: OBSTETRICS & GYNECOLOGY

## 2019-12-31 NOTE — PROGRESS NOTES
NATHEN  S/p inpatient hospitalization 12/8-12/7 for possible PPROM. Patient had stopped leaking and MEET was normal, amnisure negative, so she was d/c home.   Reports she had some blood tinged mucous over weekend but none since  No lOF  +FM  Still with irritabi

## 2020-01-03 ENCOUNTER — ROUTINE PRENATAL (OUTPATIENT)
Dept: OBGYN CLINIC | Facility: CLINIC | Age: 30
End: 2020-01-03
Payer: COMMERCIAL

## 2020-01-03 ENCOUNTER — ULTRASOUND ENCOUNTER (OUTPATIENT)
Dept: OBGYN CLINIC | Facility: CLINIC | Age: 30
End: 2020-01-03
Payer: MEDICAID

## 2020-01-03 VITALS — WEIGHT: 138 LBS | SYSTOLIC BLOOD PRESSURE: 106 MMHG | BODY MASS INDEX: 24 KG/M2 | DIASTOLIC BLOOD PRESSURE: 70 MMHG

## 2020-01-03 DIAGNOSIS — Z03.71 ENCOUNTER FOR SUSPECTED PREMATURE RUPTURE OF MEMBRANES, WITH RUPTURE OF MEMBRANES NOT FOUND: ICD-10-CM

## 2020-01-03 DIAGNOSIS — Z36.9 PRENATAL SCREENING ENCOUNTER: ICD-10-CM

## 2020-01-03 DIAGNOSIS — Z34.93 ENCOUNTER FOR SUPERVISION OF NORMAL PREGNANCY IN THIRD TRIMESTER, UNSPECIFIED GRAVIDITY: Primary | ICD-10-CM

## 2020-01-03 LAB — MULTISTIX LOT#: NORMAL NUMERIC

## 2020-01-03 PROCEDURE — 81002 URINALYSIS NONAUTO W/O SCOPE: CPT | Performed by: OBSTETRICS & GYNECOLOGY

## 2020-01-03 PROCEDURE — 76815 OB US LIMITED FETUS(S): CPT | Performed by: OBSTETRICS & GYNECOLOGY

## 2020-01-03 NOTE — PATIENT INSTRUCTIONS
Labor Instructions    How do I know if it’s true labor? • One of the most important aspects of any pregnancy is being able to recognize the onset of labor.   Unfortunately, on occasion it can be difficult or confusing, especially if you have had one or mor of the contractions. Having regular (usually closer), longer lasting (35-70 seconds), and sharper (more painful) contractions are the common symptoms of actual labor.   The second way in which labor can begin which occurs in approximately 30% of all patien the room during your labor. During the delivery, the nurses will inform you of the hospital policy and how many coaches are allowed. You may desire pain medication or anesthesia for pain.   You probably discussed some aspects of pain medication with us dur Please call the office within a few days after you are discharged from the hospital to schedule your post-partum visit, which is usually 4-6 weeks after delivery. Any medications necessary will be discussed on an individual basis.   If you decide to nikki Time M T W Th F S S                                                                                                           Time M T W Th

## 2020-01-03 NOTE — PROGRESS NOTES
NATHEN  Doing well, +FM  Rh positive, TDAP received, EPDS 0  Fetal movement count given  Denies VB. Irregular UCx but not current. +Clear, small LOF with last episode last night and about 1-2 episodes daily. She is unsure if vaginal discharge or LOF.    S/p

## 2020-01-06 NOTE — TELEPHONE ENCOUNTER
The [facility] sent more forms. These were put in the Nurse mailbox in [place].

## 2020-01-07 NOTE — TELEPHONE ENCOUNTER
Form received in [employer].  Completed and on RN desk in [facility] for Dr. Phi Nieto to review and sign on 1/8/20

## 2020-01-08 ENCOUNTER — MED REC SCAN ONLY (OUTPATIENT)
Dept: OBGYN CLINIC | Facility: CLINIC | Age: 30
End: 2020-01-08

## 2020-01-08 NOTE — TELEPHONE ENCOUNTER
Signed forms received.     Copy to scan  Copy to file in [facility] to [facility] [place] with clinicals- [phone]

## 2020-01-13 ENCOUNTER — ROUTINE PRENATAL (OUTPATIENT)
Dept: OBGYN CLINIC | Facility: CLINIC | Age: 30
End: 2020-01-13
Payer: COMMERCIAL

## 2020-01-13 VITALS — WEIGHT: 144 LBS | SYSTOLIC BLOOD PRESSURE: 118 MMHG | DIASTOLIC BLOOD PRESSURE: 70 MMHG | BODY MASS INDEX: 25 KG/M2

## 2020-01-13 DIAGNOSIS — Z34.83 ENCOUNTER FOR SUPERVISION OF OTHER NORMAL PREGNANCY IN THIRD TRIMESTER: Primary | ICD-10-CM

## 2020-01-13 DIAGNOSIS — Z36.9 PRENATAL SCREENING ENCOUNTER: ICD-10-CM

## 2020-01-13 LAB
GLUCOSE (URINE DIPSTICK): NEGATIVE MG/DL
MULTISTIX LOT#: NORMAL NUMERIC
PROTEIN (URINE DIPSTICK): NEGATIVE MG/DL

## 2020-01-13 PROCEDURE — 87653 STREP B DNA AMP PROBE: CPT | Performed by: OBSTETRICS & GYNECOLOGY

## 2020-01-13 PROCEDURE — 87081 CULTURE SCREEN ONLY: CPT | Performed by: OBSTETRICS & GYNECOLOGY

## 2020-01-13 PROCEDURE — 81002 URINALYSIS NONAUTO W/O SCOPE: CPT | Performed by: OBSTETRICS & GYNECOLOGY

## 2020-01-13 NOTE — PROGRESS NOTES
NATHEN - 36w2d    Doing well, +FM  Denies LOF/VB/uctx  /70   Wt 144 lb (65.3 kg)   LMP 05/04/2019 (Approximate)   BMI 24.72 kg/m²    GBS collected     RTC 1 week

## 2020-01-20 ENCOUNTER — ROUTINE PRENATAL (OUTPATIENT)
Dept: OBGYN CLINIC | Facility: CLINIC | Age: 30
End: 2020-01-20
Payer: MEDICAID

## 2020-01-20 VITALS — BODY MASS INDEX: 25 KG/M2 | DIASTOLIC BLOOD PRESSURE: 74 MMHG | WEIGHT: 145 LBS | SYSTOLIC BLOOD PRESSURE: 122 MMHG

## 2020-01-20 DIAGNOSIS — Z34.83 ENCOUNTER FOR SUPERVISION OF OTHER NORMAL PREGNANCY IN THIRD TRIMESTER: ICD-10-CM

## 2020-01-20 DIAGNOSIS — Z36.9 PRENATAL SCREENING ENCOUNTER: Primary | ICD-10-CM

## 2020-01-20 PROCEDURE — 81002 URINALYSIS NONAUTO W/O SCOPE: CPT | Performed by: OBSTETRICS & GYNECOLOGY

## 2020-01-27 ENCOUNTER — ROUTINE PRENATAL (OUTPATIENT)
Dept: OBGYN CLINIC | Facility: CLINIC | Age: 30
End: 2020-01-27
Payer: COMMERCIAL

## 2020-01-27 VITALS
BODY MASS INDEX: 24.75 KG/M2 | HEIGHT: 64 IN | SYSTOLIC BLOOD PRESSURE: 104 MMHG | DIASTOLIC BLOOD PRESSURE: 80 MMHG | WEIGHT: 145 LBS

## 2020-01-27 DIAGNOSIS — Z34.83 ENCOUNTER FOR SUPERVISION OF OTHER NORMAL PREGNANCY IN THIRD TRIMESTER: Primary | ICD-10-CM

## 2020-01-27 LAB
MULTISTIX EXPIRATION DATE: NORMAL DATE
MULTISTIX LOT#: NORMAL NUMERIC

## 2020-01-27 PROCEDURE — 81002 URINALYSIS NONAUTO W/O SCOPE: CPT | Performed by: OBSTETRICS & GYNECOLOGY

## 2020-01-27 NOTE — PROGRESS NOTES
NATHEN  Doing ok, lots of pelvic pressure and discomfort, wants to be induced if possible,  +FM  Contractions q 8 min  No LOF, VB    SVE: cervix mostly 1+ but internal os thin but fingerip, long/-2    Patient Active Problem List:     Encounter for supervision

## 2020-01-28 ENCOUNTER — TELEPHONE (OUTPATIENT)
Dept: OBGYN UNIT | Facility: HOSPITAL | Age: 30
End: 2020-01-28

## 2020-01-29 ENCOUNTER — TELEPHONE (OUTPATIENT)
Dept: OBGYN UNIT | Facility: HOSPITAL | Age: 30
End: 2020-01-29

## 2020-01-31 ENCOUNTER — TELEPHONE (OUTPATIENT)
Dept: OBGYN UNIT | Facility: HOSPITAL | Age: 30
End: 2020-01-31

## 2020-02-03 ENCOUNTER — ROUTINE PRENATAL (OUTPATIENT)
Dept: OBGYN CLINIC | Facility: CLINIC | Age: 30
End: 2020-02-03
Payer: COMMERCIAL

## 2020-02-03 VITALS
DIASTOLIC BLOOD PRESSURE: 82 MMHG | BODY MASS INDEX: 24.75 KG/M2 | HEIGHT: 64 IN | WEIGHT: 145 LBS | HEART RATE: 92 BPM | SYSTOLIC BLOOD PRESSURE: 116 MMHG

## 2020-02-03 DIAGNOSIS — O99.820 GBS (GROUP B STREPTOCOCCUS CARRIER), +RV CULTURE, CURRENTLY PREGNANT: ICD-10-CM

## 2020-02-03 DIAGNOSIS — Z34.83 ENCOUNTER FOR SUPERVISION OF OTHER NORMAL PREGNANCY IN THIRD TRIMESTER: Primary | ICD-10-CM

## 2020-02-03 DIAGNOSIS — Z36.9 PRENATAL SCREENING ENCOUNTER: ICD-10-CM

## 2020-02-03 PROCEDURE — 81002 URINALYSIS NONAUTO W/O SCOPE: CPT | Performed by: OBSTETRICS & GYNECOLOGY

## 2020-02-03 NOTE — PROGRESS NOTES
NATHEN  Doing well, +FM  Denies LOF/VB/uctx  Mode of delivery:  anticipated  SVE /-3   GBS positive  Fetal movement count given  Labor precautions provided   Scheduled for IOL on 2020 per patient request     RTC 1 week

## 2020-02-04 ENCOUNTER — ANESTHESIA (OUTPATIENT)
Dept: OBGYN UNIT | Facility: HOSPITAL | Age: 30
End: 2020-02-04
Payer: COMMERCIAL

## 2020-02-04 ENCOUNTER — ANESTHESIA EVENT (OUTPATIENT)
Dept: OBGYN UNIT | Facility: HOSPITAL | Age: 30
End: 2020-02-04
Payer: COMMERCIAL

## 2020-02-04 ENCOUNTER — APPOINTMENT (OUTPATIENT)
Dept: OBGYN CLINIC | Facility: HOSPITAL | Age: 30
End: 2020-02-04
Payer: COMMERCIAL

## 2020-02-04 ENCOUNTER — HOSPITAL ENCOUNTER (INPATIENT)
Facility: HOSPITAL | Age: 30
LOS: 2 days | Discharge: HOME | End: 2020-02-06
Attending: OBSTETRICS & GYNECOLOGY | Admitting: OBSTETRICS & GYNECOLOGY
Payer: COMMERCIAL

## 2020-02-04 LAB
ANTIBODY SCREEN: NEGATIVE
DEPRECATED RDW RBC AUTO: 44.5 FL (ref 35.1–46.3)
ERYTHROCYTE [DISTWIDTH] IN BLOOD BY AUTOMATED COUNT: 13.7 % (ref 11–15)
HCT VFR BLD AUTO: 38.9 % (ref 35–48)
HGB BLD-MCNC: 13.1 G/DL (ref 12–16)
MCH RBC QN AUTO: 29.8 PG (ref 26–34)
MCHC RBC AUTO-ENTMCNC: 33.7 G/DL (ref 31–37)
MCV RBC AUTO: 88.4 FL (ref 80–100)
PLATELET # BLD AUTO: 193 10(3)UL (ref 150–450)
RBC # BLD AUTO: 4.4 X10(6)UL (ref 3.8–5.3)
RH BLOOD TYPE: POSITIVE
T PALLIDUM AB SER QL IA: NONREACTIVE
WBC # BLD AUTO: 8.2 X10(3) UL (ref 4–11)

## 2020-02-04 PROCEDURE — 3E033VJ INTRODUCTION OF OTHER HORMONE INTO PERIPHERAL VEIN, PERCUTANEOUS APPROACH: ICD-10-PCS | Performed by: OBSTETRICS & GYNECOLOGY

## 2020-02-04 PROCEDURE — 59400 OBSTETRICAL CARE: CPT | Performed by: OBSTETRICS & GYNECOLOGY

## 2020-02-04 PROCEDURE — 10907ZC DRAINAGE OF AMNIOTIC FLUID, THERAPEUTIC FROM PRODUCTS OF CONCEPTION, VIA NATURAL OR ARTIFICIAL OPENING: ICD-10-PCS | Performed by: OBSTETRICS & GYNECOLOGY

## 2020-02-04 RX ORDER — BISACODYL 10 MG
10 SUPPOSITORY, RECTAL RECTAL ONCE AS NEEDED
Status: ACTIVE | OUTPATIENT
Start: 2020-02-04 | End: 2020-02-04

## 2020-02-04 RX ORDER — DEXTROSE, SODIUM CHLORIDE, SODIUM LACTATE, POTASSIUM CHLORIDE, AND CALCIUM CHLORIDE 5; .6; .31; .03; .02 G/100ML; G/100ML; G/100ML; G/100ML; G/100ML
INJECTION, SOLUTION INTRAVENOUS AS NEEDED
Status: DISCONTINUED | OUTPATIENT
Start: 2020-02-04 | End: 2020-02-04

## 2020-02-04 RX ORDER — ONDANSETRON 2 MG/ML
4 INJECTION INTRAMUSCULAR; INTRAVENOUS EVERY 6 HOURS PRN
Status: DISCONTINUED | OUTPATIENT
Start: 2020-02-04 | End: 2020-02-04

## 2020-02-04 RX ORDER — ACETAMINOPHEN 325 MG/1
650 TABLET ORAL EVERY 6 HOURS PRN
Status: DISCONTINUED | OUTPATIENT
Start: 2020-02-04 | End: 2020-02-06

## 2020-02-04 RX ORDER — EPHEDRINE SULFATE/0.9% NACL/PF 25 MG/5 ML
5 SYRINGE (ML) INTRAVENOUS AS NEEDED
Status: DISCONTINUED | OUTPATIENT
Start: 2020-02-04 | End: 2020-02-04

## 2020-02-04 RX ORDER — ACETAMINOPHEN 500 MG
500 TABLET ORAL ONCE AS NEEDED
Status: DISCONTINUED | OUTPATIENT
Start: 2020-02-04 | End: 2020-02-04

## 2020-02-04 RX ORDER — AMMONIA INHALANTS 0.04 G/.3ML
0.3 INHALANT RESPIRATORY (INHALATION) AS NEEDED
Status: DISCONTINUED | OUTPATIENT
Start: 2020-02-04 | End: 2020-02-04

## 2020-02-04 RX ORDER — TRISODIUM CITRATE DIHYDRATE AND CITRIC ACID MONOHYDRATE 500; 334 MG/5ML; MG/5ML
30 SOLUTION ORAL AS NEEDED
Status: DISCONTINUED | OUTPATIENT
Start: 2020-02-04 | End: 2020-02-04

## 2020-02-04 RX ORDER — TERBUTALINE SULFATE 1 MG/ML
0.25 INJECTION, SOLUTION SUBCUTANEOUS AS NEEDED
Status: DISCONTINUED | OUTPATIENT
Start: 2020-02-04 | End: 2020-02-04

## 2020-02-04 RX ORDER — IBUPROFEN 600 MG/1
600 TABLET ORAL ONCE AS NEEDED
Status: DISCONTINUED | OUTPATIENT
Start: 2020-02-04 | End: 2020-02-04 | Stop reason: HOSPADM

## 2020-02-04 RX ORDER — SIMETHICONE 80 MG
80 TABLET,CHEWABLE ORAL 3 TIMES DAILY PRN
Status: DISCONTINUED | OUTPATIENT
Start: 2020-02-04 | End: 2020-02-06

## 2020-02-04 RX ORDER — DIPHENHYDRAMINE HYDROCHLORIDE 50 MG/ML
12.5 INJECTION INTRAMUSCULAR; INTRAVENOUS EVERY 4 HOURS PRN
Status: DISCONTINUED | OUTPATIENT
Start: 2020-02-04 | End: 2020-02-04

## 2020-02-04 RX ORDER — SODIUM CHLORIDE, SODIUM LACTATE, POTASSIUM CHLORIDE, CALCIUM CHLORIDE 600; 310; 30; 20 MG/100ML; MG/100ML; MG/100ML; MG/100ML
INJECTION, SOLUTION INTRAVENOUS CONTINUOUS
Status: DISCONTINUED | OUTPATIENT
Start: 2020-02-04 | End: 2020-02-04

## 2020-02-04 RX ORDER — DOCUSATE SODIUM 100 MG/1
100 CAPSULE, LIQUID FILLED ORAL
Status: DISCONTINUED | OUTPATIENT
Start: 2020-02-04 | End: 2020-02-06

## 2020-02-04 RX ORDER — ZOLPIDEM TARTRATE 5 MG/1
5 TABLET ORAL NIGHTLY PRN
Status: DISCONTINUED | OUTPATIENT
Start: 2020-02-04 | End: 2020-02-06

## 2020-02-04 RX ORDER — IBUPROFEN 600 MG/1
600 TABLET ORAL EVERY 6 HOURS
Status: DISCONTINUED | OUTPATIENT
Start: 2020-02-04 | End: 2020-02-06

## 2020-02-04 NOTE — ANESTHESIA PREPROCEDURE EVALUATION
PRE-OP EVALUATION    Patient Name: Debra Vasquez    Pre-op Diagnosis: * No pre-op diagnosis entered *    * No procedures listed *    * No surgeons found in log *    Pre-op vitals reviewed.   Temp: 97.5 °F (36.4 °C)  Pulse: 85  Resp: 18  BP: 132/78  SpO2: 100 , Rfl:   PRENATAL MULTI +DHA (PNV WITH DHA) 27-0.8-228 MG Oral Cap, Take 1 capsule by mouth daily. , Disp: , Rfl:         Allergies: Patient has no known allergies. Anesthesia Evaluation    Patient summary reviewed.     Anesthetic Complications  (-) his

## 2020-02-04 NOTE — PROGRESS NOTES
Pt is a 34year old female admitted to 105/105-A, Patient presents with:  Scheduled Induction     Pt is 39w3d intra-uterine pregnancy. Denies any leaking of fluid. Reports +fetal movement. History obtained, consents signed.  Oriented to room, staff, and lorna

## 2020-02-04 NOTE — ANESTHESIA PROCEDURE NOTES
Labor Analgesia  Performed by: Jackelin Ellison MD  Authorized by: Jackelin Ellison MD       General Information and Staff    Start Time:  2/4/2020 3:11 PM  End Time:  2/4/2020 3:21 PM  Anesthesiologist:  Jackelin Ellison MD  Performed by:   Anesthesiologist

## 2020-02-04 NOTE — H&P
[address] [facility] Patient Status:  Inpatient    10/25/1990 MRN [id number]   Location [facility] Attending Kuldip Willis MD    0 PCP None Pcp     CC: patient is here for IOL    SUBJECTIVE:    [place] Grandmother    • Hypertension Paternal Grandmother    • Diabetes Paternal Grandfather    • Hypertension Paternal Grandfather      Social History: Social History    Tobacco Use      Smoking status: Former Smoker        Years: 10.00      Smokeless tobacco: N

## 2020-02-05 LAB
BASOPHILS # BLD AUTO: 0.04 X10(3) UL (ref 0–0.2)
BASOPHILS NFR BLD AUTO: 0.3 %
DEPRECATED RDW RBC AUTO: 43.4 FL (ref 35.1–46.3)
EOSINOPHIL # BLD AUTO: 0.1 X10(3) UL (ref 0–0.7)
EOSINOPHIL NFR BLD AUTO: 0.8 %
ERYTHROCYTE [DISTWIDTH] IN BLOOD BY AUTOMATED COUNT: 13.6 % (ref 11–15)
HCT VFR BLD AUTO: 36.2 % (ref 35–48)
HGB BLD-MCNC: 12.1 G/DL (ref 12–16)
IMM GRANULOCYTES # BLD AUTO: 0.08 X10(3) UL (ref 0–1)
IMM GRANULOCYTES NFR BLD: 0.7 %
LYMPHOCYTES # BLD AUTO: 2.48 X10(3) UL (ref 1–4)
LYMPHOCYTES NFR BLD AUTO: 21.1 %
MCH RBC QN AUTO: 29.2 PG (ref 26–34)
MCHC RBC AUTO-ENTMCNC: 33.4 G/DL (ref 31–37)
MCV RBC AUTO: 87.4 FL (ref 80–100)
MONOCYTES # BLD AUTO: 0.99 X10(3) UL (ref 0.1–1)
MONOCYTES NFR BLD AUTO: 8.4 %
NEUTROPHILS # BLD AUTO: 8.08 X10 (3) UL (ref 1.5–7.7)
NEUTROPHILS # BLD AUTO: 8.08 X10(3) UL (ref 1.5–7.7)
NEUTROPHILS NFR BLD AUTO: 68.7 %
PLATELET # BLD AUTO: 172 10(3)UL (ref 150–450)
RBC # BLD AUTO: 4.14 X10(6)UL (ref 3.8–5.3)
WBC # BLD AUTO: 11.8 X10(3) UL (ref 4–11)

## 2020-02-05 NOTE — PROGRESS NOTES
PPD#1  No C/O  Afebrile, VS stable  H.1  Fundus firm, bleeding slowed  Voiding  Doing well, home tomorrow

## 2020-02-05 NOTE — PROGRESS NOTES
Labor Analgesia Follow Up Note    Patient underwent epidural anesthesia for labor analgesia,    Placenta Date/Time: 2/4/2020  6:21 PM    Delivery Date/Time[de-identified] 2/4/2020  6:18 PM    /68   Pulse 73   Temp 97.6 °F (36.4 °C) (Oral)   Resp 17   Ht 1.626 m (

## 2020-02-05 NOTE — L&D DELIVERY NOTE
Daniel Gorman, Pending [[id number]]    Labor Events     labor?:  No   steroids?:  Full Course  Antibiotics received during labor?:  Yes  Antibiotics (enter # doses in comment):  ampicillin  Rupture date/time:  2019 1930     Rupture type:  SROM, 2  2       Muscle tone: 2  2       Respiratory effort: 2  2       Total: 9  9          Apgars assigned by:  Huan Hudson RN   disposition:  with mother     Skin to Skin    No data filed      Vaginal Count    Initial count RN:  Dionisio Holcomb RN  Init condition.

## 2020-02-05 NOTE — PROGRESS NOTES
Pt up to BR with assistance from this RN. Gait steady. Pt unable to void at this time. Day-bottle given. Dermoplast to perineum. Pt up to [facility] without incident.

## 2020-02-06 VITALS
HEIGHT: 64 IN | HEART RATE: 67 BPM | TEMPERATURE: 98 F | WEIGHT: 145 LBS | BODY MASS INDEX: 24.75 KG/M2 | DIASTOLIC BLOOD PRESSURE: 70 MMHG | SYSTOLIC BLOOD PRESSURE: 121 MMHG | RESPIRATION RATE: 18 BRPM | OXYGEN SATURATION: 100 %

## 2020-02-06 PROBLEM — Z34.90 PREGNANCY (HCC): Status: RESOLVED | Noted: 2019-12-08 | Resolved: 2020-02-06

## 2020-02-06 PROBLEM — Z34.90 PREGNANCY: Status: RESOLVED | Noted: 2019-12-08 | Resolved: 2020-02-06

## 2020-02-06 RX ORDER — IBUPROFEN 600 MG/1
600 TABLET ORAL EVERY 6 HOURS PRN
Qty: 40 TABLET | Refills: 0 | Status: SHIPPED | OUTPATIENT
Start: 2020-02-06 | End: 2020-03-09

## 2020-02-06 RX ORDER — PSEUDOEPHEDRINE HCL 30 MG
100 TABLET ORAL 2 TIMES DAILY
Qty: 60 CAPSULE | Refills: 0 | Status: SHIPPED | OUTPATIENT
Start: 2020-02-06 | End: 2021-04-15

## 2020-02-06 NOTE — DISCHARGE SUMMARY
[facility]  Discharge Summary    Neptali Montanez Patient Status:  inpatient    10/25/1990 MRN [id number]   Location 1108/1108-A Attending EMG [address] # 2 PCP None Pcp     Date of Admission: 2020    Date of Discharge: 20    Admitting

## 2020-02-06 NOTE — PROGRESS NOTES
Baby breastfeeding well, all dc teaching reviewed earlier and all questions answered. Pt states comfortable caring for self and baby. Discharged in stable condition with family and accompanied by staff at 21  per ambulatory.

## 2020-02-08 ENCOUNTER — TELEPHONE (OUTPATIENT)
Dept: OBGYN UNIT | Facility: HOSPITAL | Age: 30
End: 2020-02-08

## 2020-03-06 ENCOUNTER — POSTPARTUM (OUTPATIENT)
Dept: OBGYN CLINIC | Facility: CLINIC | Age: 30
End: 2020-03-06
Payer: COMMERCIAL

## 2020-03-06 VITALS
SYSTOLIC BLOOD PRESSURE: 122 MMHG | BODY MASS INDEX: 21.17 KG/M2 | HEART RATE: 85 BPM | WEIGHT: 124 LBS | DIASTOLIC BLOOD PRESSURE: 80 MMHG | HEIGHT: 64 IN

## 2020-03-06 PROBLEM — O99.820 GBS (GROUP B STREPTOCOCCUS CARRIER), +RV CULTURE, CURRENTLY PREGNANT: Status: RESOLVED | Noted: 2020-02-03 | Resolved: 2020-03-06

## 2020-03-06 PROBLEM — O99.820 GBS (GROUP B STREPTOCOCCUS CARRIER), +RV CULTURE, CURRENTLY PREGNANT (HCC): Status: RESOLVED | Noted: 2020-02-03 | Resolved: 2020-03-06

## 2020-03-06 NOTE — PROGRESS NOTES
Post Partum  No C/O  Minimal bleeding  nursing  Still with pelvic pain, fells like dropping out, started in second trimester  Discussed prolapse    ROS: No Cardiac, Respiratory, GI,  or Neurological symptoms.     PE:  Abdomen soft  Pelvic:External vag nor

## 2020-03-09 ENCOUNTER — OFFICE VISIT (OUTPATIENT)
Dept: OBGYN CLINIC | Facility: CLINIC | Age: 30
End: 2020-03-09
Payer: COMMERCIAL

## 2020-03-09 VITALS — SYSTOLIC BLOOD PRESSURE: 100 MMHG | DIASTOLIC BLOOD PRESSURE: 60 MMHG | BODY MASS INDEX: 21 KG/M2 | WEIGHT: 125 LBS

## 2020-03-09 DIAGNOSIS — Z30.430 ENCOUNTER FOR INSERTION OF INTRAUTERINE CONTRACEPTIVE DEVICE: Primary | ICD-10-CM

## 2020-03-09 LAB — CONTROL LINE PRESENT WITH A CLEAR BACKGROUND (YES/NO): YES YES/NO

## 2020-03-09 PROCEDURE — 58300 INSERT INTRAUTERINE DEVICE: CPT | Performed by: OBSTETRICS & GYNECOLOGY

## 2020-03-09 PROCEDURE — 81025 URINE PREGNANCY TEST: CPT | Performed by: OBSTETRICS & GYNECOLOGY

## 2020-03-09 NOTE — PROGRESS NOTES
Procedure: The patient was consented for Mirena placement, all risks and benefits were discussed. After all questions were answered, the patient was positioned in stirups for procedure. On exam, a small anteverted uterus was noted.  The sterile speculu

## 2021-04-01 DIAGNOSIS — Z23 NEED FOR VACCINATION: ICD-10-CM

## 2021-04-15 ENCOUNTER — OFFICE VISIT (OUTPATIENT)
Dept: OBGYN CLINIC | Facility: CLINIC | Age: 31
End: 2021-04-15
Payer: COMMERCIAL

## 2021-04-15 VITALS
DIASTOLIC BLOOD PRESSURE: 68 MMHG | SYSTOLIC BLOOD PRESSURE: 112 MMHG | BODY MASS INDEX: 22.02 KG/M2 | HEIGHT: 64 IN | WEIGHT: 129 LBS

## 2021-04-15 DIAGNOSIS — N92.6 IRREGULAR MENSES: ICD-10-CM

## 2021-04-15 DIAGNOSIS — R10.2 PELVIC PAIN: Primary | ICD-10-CM

## 2021-04-15 PROCEDURE — 99213 OFFICE O/P EST LOW 20 MIN: CPT | Performed by: NURSE PRACTITIONER

## 2021-04-15 PROCEDURE — 87491 CHLMYD TRACH DNA AMP PROBE: CPT | Performed by: NURSE PRACTITIONER

## 2021-04-15 PROCEDURE — 87660 TRICHOMONAS VAGIN DIR PROBE: CPT | Performed by: NURSE PRACTITIONER

## 2021-04-15 PROCEDURE — 3008F BODY MASS INDEX DOCD: CPT | Performed by: NURSE PRACTITIONER

## 2021-04-15 PROCEDURE — 3074F SYST BP LT 130 MM HG: CPT | Performed by: NURSE PRACTITIONER

## 2021-04-15 PROCEDURE — 87480 CANDIDA DNA DIR PROBE: CPT | Performed by: NURSE PRACTITIONER

## 2021-04-15 PROCEDURE — 3078F DIAST BP <80 MM HG: CPT | Performed by: NURSE PRACTITIONER

## 2021-04-15 PROCEDURE — 87510 GARDNER VAG DNA DIR PROBE: CPT | Performed by: NURSE PRACTITIONER

## 2021-04-15 PROCEDURE — 87591 N.GONORRHOEAE DNA AMP PROB: CPT | Performed by: NURSE PRACTITIONER

## 2021-04-15 NOTE — PROGRESS NOTES
Gyne note     S: patient is a 31year old yo  here for a recent onset of left sided pain, more so with intercourse. She had a Mirena IUD placed post- partum and had no menses the first few months.  Since around 3 months after placement she has had ep

## 2021-05-03 ENCOUNTER — ULTRASOUND ENCOUNTER (OUTPATIENT)
Dept: OBGYN CLINIC | Facility: CLINIC | Age: 31
End: 2021-05-03
Payer: COMMERCIAL

## 2021-05-03 DIAGNOSIS — R10.2 PELVIC PAIN: ICD-10-CM

## 2021-05-03 DIAGNOSIS — N93.9 ABNORMAL UTERINE BLEEDING (AUB): Primary | ICD-10-CM

## 2021-05-03 PROCEDURE — 76830 TRANSVAGINAL US NON-OB: CPT | Performed by: OBSTETRICS & GYNECOLOGY

## 2021-05-03 PROCEDURE — 76856 US EXAM PELVIC COMPLETE: CPT | Performed by: OBSTETRICS & GYNECOLOGY

## 2021-10-12 ENCOUNTER — TELEPHONE (OUTPATIENT)
Dept: OBGYN CLINIC | Facility: CLINIC | Age: 31
End: 2021-10-12

## 2021-10-12 NOTE — TELEPHONE ENCOUNTER
Patient calling to initiate prenatal care  LMP 08-29-21  Patient is 7-8 weeks on  10-26-21  Confirmation Ultrasound and Appointment scheduled on 10-29-21  Insurance deena   Good time to return phone call  Anytime

## 2021-10-12 NOTE — TELEPHONE ENCOUNTER
LMP: 21  EDC based on lmp: 22    8 wks on 10/24; appt on 10/29 at 8 5/7 wks        Are cycles regular?: no    Medical problems: denies    Past sx hx: denies    Current medications: PNV    Past pregnancy complications: term NSVDx3  Post partum

## 2021-10-29 ENCOUNTER — APPOINTMENT (OUTPATIENT)
Dept: OBGYN CLINIC | Facility: CLINIC | Age: 31
End: 2021-10-29
Payer: MEDICAID

## 2021-10-29 ENCOUNTER — OFFICE VISIT (OUTPATIENT)
Dept: OBGYN CLINIC | Facility: CLINIC | Age: 31
End: 2021-10-29
Payer: COMMERCIAL

## 2021-10-29 VITALS
SYSTOLIC BLOOD PRESSURE: 110 MMHG | WEIGHT: 138.38 LBS | DIASTOLIC BLOOD PRESSURE: 80 MMHG | BODY MASS INDEX: 24 KG/M2 | HEART RATE: 69 BPM

## 2021-10-29 DIAGNOSIS — N91.1 SECONDARY AMENORRHEA: Primary | ICD-10-CM

## 2021-10-29 PROCEDURE — 76830 TRANSVAGINAL US NON-OB: CPT | Performed by: OBSTETRICS & GYNECOLOGY

## 2021-10-29 PROCEDURE — 99213 OFFICE O/P EST LOW 20 MIN: CPT | Performed by: OBSTETRICS & GYNECOLOGY

## 2021-10-29 PROCEDURE — 3074F SYST BP LT 130 MM HG: CPT | Performed by: OBSTETRICS & GYNECOLOGY

## 2021-10-29 PROCEDURE — 3079F DIAST BP 80-89 MM HG: CPT | Performed by: OBSTETRICS & GYNECOLOGY

## 2021-10-29 RX ORDER — METOCLOPRAMIDE 5 MG/1
5 TABLET ORAL
Qty: 30 TABLET | Refills: 2 | Status: SHIPPED | OUTPATIENT
Start: 2021-10-29

## 2021-10-29 NOTE — PATIENT INSTRUCTIONS
During pregnancy, here are some general recommendations:  1.  Prenatal Vitamins: Pregnant women should consume the following each day through diet or supplements:  o Folic acid 497–875 micrograms   o Iron 30 mg (or be screened for anemia)  o Vitamin D 600 i cleanings, extraction, scaling, root          canal, radiographs (assuming the abdomen and thyroid are shielded), and restoration and fillings  14.  Please check the CDC website regarding the [address] virus if you are planning to travel to [place] or [place] to increase the sensitivity of the test.  You will need to schedule an appointment with the Maternal Fetal Medicine office. Jihan Gale and phone number below:   Please verify insurance coverage:   CPT code: 23142 (jordan) or 16375 (twins)   Diagnosis: Gen bifida (an abnormal opening in the spine).  It is usually performed around 16-20 weeks.  Additional testing such as a Level II ultrasound may be recommended for an abnormal test result.    Please verify insurance coverage:   CPT code: 47739   Diagnosis code complications.  A similar procedure called Chorionic Villus Sampling (CVS) is performed by passing a catheter through the vagina to remove a small sample of tissue from the placenta (afterbirth).  CVS may have a higher risk of miscarriage and other rare co

## 2021-10-29 NOTE — PROGRESS NOTES
[facility]  Obstetrics and Gynecology    Subjective:     Karoline Ibrahim is a 36year old  female presents with c/o secondary amenorrhea and positive pregnancy test. The patient was recommended to return for further evaluation.  The patient re weeks 2 days with ROMÁN of 6/15/2022 via US today NOT c/w LMP  - Pt counseled on safety, diet, exercise, caffiene, tobacco, ETOH, sexual activity, ER precautions, diet, exercise, appropriate otc medication use, substance abuse   - Counseled on taking a PNV w

## 2021-10-31 PROBLEM — N91.1 SECONDARY AMENORRHEA: Status: ACTIVE | Noted: 2021-10-31

## 2021-11-22 PROBLEM — Z87.59 HISTORY OF POSTPARTUM DEPRESSION: Status: ACTIVE | Noted: 2021-11-22

## 2021-11-22 PROBLEM — Z87.59 HISTORY OF POSTPARTUM HEMORRHAGE: Status: ACTIVE | Noted: 2021-11-22

## 2021-11-22 PROBLEM — Z86.59 HISTORY OF POSTPARTUM DEPRESSION: Status: ACTIVE | Noted: 2021-11-22

## 2021-11-23 ENCOUNTER — LAB ENCOUNTER (OUTPATIENT)
Dept: LAB | Age: 31
End: 2021-11-23
Attending: OBSTETRICS & GYNECOLOGY
Payer: COMMERCIAL

## 2021-11-23 ENCOUNTER — INITIAL PRENATAL (OUTPATIENT)
Dept: OBGYN CLINIC | Facility: CLINIC | Age: 31
End: 2021-11-23
Payer: MEDICAID

## 2021-11-23 VITALS
BODY MASS INDEX: 23.27 KG/M2 | WEIGHT: 138 LBS | DIASTOLIC BLOOD PRESSURE: 80 MMHG | HEART RATE: 87 BPM | HEIGHT: 64.5 IN | SYSTOLIC BLOOD PRESSURE: 128 MMHG

## 2021-11-23 DIAGNOSIS — Z36.9 ANTENATAL SCREENING ENCOUNTER: ICD-10-CM

## 2021-11-23 DIAGNOSIS — Z34.90 PRENATAL CARE, ANTEPARTUM: Primary | ICD-10-CM

## 2021-11-23 DIAGNOSIS — Z36.8A ENCOUNTER FOR ANTENATAL SCREENING FOR OTHER GENETIC DEFECT: ICD-10-CM

## 2021-11-23 PROCEDURE — 86780 TREPONEMA PALLIDUM: CPT

## 2021-11-23 PROCEDURE — 86870 RBC ANTIBODY IDENTIFICATION: CPT

## 2021-11-23 PROCEDURE — 86901 BLOOD TYPING SEROLOGIC RH(D): CPT

## 2021-11-23 PROCEDURE — 81002 URINALYSIS NONAUTO W/O SCOPE: CPT | Performed by: OBSTETRICS & GYNECOLOGY

## 2021-11-23 PROCEDURE — 86905 BLOOD TYPING RBC ANTIGENS: CPT

## 2021-11-23 PROCEDURE — 0500F INITIAL PRENATAL CARE VISIT: CPT | Performed by: OBSTETRICS & GYNECOLOGY

## 2021-11-23 PROCEDURE — 3008F BODY MASS INDEX DOCD: CPT | Performed by: OBSTETRICS & GYNECOLOGY

## 2021-11-23 PROCEDURE — 87340 HEPATITIS B SURFACE AG IA: CPT

## 2021-11-23 PROCEDURE — 3074F SYST BP LT 130 MM HG: CPT | Performed by: OBSTETRICS & GYNECOLOGY

## 2021-11-23 PROCEDURE — 87389 HIV-1 AG W/HIV-1&-2 AB AG IA: CPT

## 2021-11-23 PROCEDURE — 86762 RUBELLA ANTIBODY: CPT

## 2021-11-23 PROCEDURE — 86886 COOMBS TEST INDIRECT TITER: CPT

## 2021-11-23 PROCEDURE — 86850 RBC ANTIBODY SCREEN: CPT

## 2021-11-23 PROCEDURE — 85025 COMPLETE CBC W/AUTO DIFF WBC: CPT

## 2021-11-23 PROCEDURE — 87086 URINE CULTURE/COLONY COUNT: CPT

## 2021-11-23 PROCEDURE — 86900 BLOOD TYPING SEROLOGIC ABO: CPT

## 2021-11-23 PROCEDURE — 3079F DIAST BP 80-89 MM HG: CPT | Performed by: OBSTETRICS & GYNECOLOGY

## 2021-11-23 PROCEDURE — 36415 COLL VENOUS BLD VENIPUNCTURE: CPT

## 2021-11-23 NOTE — PROGRESS NOTES
Subjective:  Patient presents with:  Prenatal Care: Nallely Edmonds, LMP 08/31/21    36year old  female  Dakota Mode  presents for new OB visit    Patient's last menstrual period was 08/31/2021 (exact date).  Estimated Date of Delivery: 6/15/22   Patient without complaint before meals. (Patient not taking: Reported on 11/23/2021), Disp: 30 tablet, Rfl: 2    No current facility-administered medications on file prior to visit.       Allergies:  No Known Allergies    Social History:  Social History    Tobacco Use      Smoking s pregnancy. Current problems for this pregnancy:   Hx of PP hemorrhage and depression- counseled. Plan:  Pap smear 7/2019  Order for prenatal labs with MT 21 given.   Patient consents to HIV testing, counseled and cleared for release of HIV test results

## 2021-11-24 DIAGNOSIS — O36.0910 ANTI-E ISOIMMUNIZATION AFFECTING PREGNANCY IN FIRST TRIMESTER, SINGLE OR UNSPECIFIED FETUS: Primary | ICD-10-CM

## 2021-11-24 NOTE — PROGRESS NOTES
Patient notified. Normal prenatals except positive anti E titre. Referral MF.   Likely serial titres

## 2021-11-26 ENCOUNTER — LAB ENCOUNTER (OUTPATIENT)
Dept: LAB | Age: 31
End: 2021-11-26
Attending: OBSTETRICS & GYNECOLOGY
Payer: COMMERCIAL

## 2021-11-26 DIAGNOSIS — Z36.8A ENCOUNTER FOR ANTENATAL SCREENING FOR OTHER GENETIC DEFECT: ICD-10-CM

## 2021-11-29 ENCOUNTER — TELEPHONE (OUTPATIENT)
Dept: OBGYN CLINIC | Facility: CLINIC | Age: 31
End: 2021-11-29

## 2021-12-08 ENCOUNTER — TELEPHONE (OUTPATIENT)
Dept: OBGYN CLINIC | Facility: CLINIC | Age: 31
End: 2021-12-08

## 2021-12-08 NOTE — TELEPHONE ENCOUNTER
Pt calling and was referred to [facility]    Pt says they see her at 20 Weeks    Pt calling to make sure it's not supposed to be sooner?

## 2021-12-08 NOTE — TELEPHONE ENCOUNTER
Patient informed she should have anatomy scan with M at 20 weeks. She verbalized understanding. No further questions or concerns.

## 2021-12-09 ENCOUNTER — MED REC SCAN ONLY (OUTPATIENT)
Dept: OBGYN CLINIC | Facility: CLINIC | Age: 31
End: 2021-12-09

## 2021-12-23 ENCOUNTER — ROUTINE PRENATAL (OUTPATIENT)
Dept: OBGYN CLINIC | Facility: CLINIC | Age: 31
End: 2021-12-23
Payer: COMMERCIAL

## 2021-12-23 VITALS — BODY MASS INDEX: 23 KG/M2 | WEIGHT: 139 LBS | DIASTOLIC BLOOD PRESSURE: 66 MMHG | SYSTOLIC BLOOD PRESSURE: 102 MMHG

## 2021-12-23 DIAGNOSIS — O36.0910 ANTI-E ISOIMMUNIZATION AFFECTING PREGNANCY IN FIRST TRIMESTER, SINGLE OR UNSPECIFIED FETUS: ICD-10-CM

## 2021-12-23 DIAGNOSIS — Z34.90 PRENATAL CARE, ANTEPARTUM: Primary | ICD-10-CM

## 2021-12-23 PROCEDURE — 3074F SYST BP LT 130 MM HG: CPT | Performed by: OBSTETRICS & GYNECOLOGY

## 2021-12-23 PROCEDURE — 3078F DIAST BP <80 MM HG: CPT | Performed by: OBSTETRICS & GYNECOLOGY

## 2021-12-23 PROCEDURE — 0502F SUBSEQUENT PRENATAL CARE: CPT | Performed by: OBSTETRICS & GYNECOLOGY

## 2021-12-23 PROCEDURE — 81002 URINALYSIS NONAUTO W/O SCOPE: CPT | Performed by: OBSTETRICS & GYNECOLOGY

## 2021-12-23 NOTE — PROGRESS NOTES
NATHEN. 15w1d    Doing well. No complaints. Able to eat now. Denies abdominal/pelvic pain or vaginal bleeding.    Rh pos   Genetic screening - low risk cfDNA    Level II US with MFM due to Anti-E  Prenatal labs reviewed   AFP ordered   RTC in 4 weeks

## 2022-01-20 ENCOUNTER — ROUTINE PRENATAL (OUTPATIENT)
Dept: OBGYN CLINIC | Facility: CLINIC | Age: 32
End: 2022-01-20
Payer: MEDICAID

## 2022-01-20 ENCOUNTER — LAB ENCOUNTER (OUTPATIENT)
Dept: LAB | Facility: HOSPITAL | Age: 32
End: 2022-01-20
Attending: OBSTETRICS & GYNECOLOGY
Payer: COMMERCIAL

## 2022-01-20 VITALS — BODY MASS INDEX: 25 KG/M2 | WEIGHT: 146 LBS | DIASTOLIC BLOOD PRESSURE: 60 MMHG | SYSTOLIC BLOOD PRESSURE: 110 MMHG

## 2022-01-20 DIAGNOSIS — Z34.90 PRENATAL CARE, ANTEPARTUM: Primary | ICD-10-CM

## 2022-01-20 DIAGNOSIS — Z34.90 PRENATAL CARE, ANTEPARTUM: ICD-10-CM

## 2022-01-20 PROCEDURE — 3074F SYST BP LT 130 MM HG: CPT | Performed by: OBSTETRICS & GYNECOLOGY

## 2022-01-20 PROCEDURE — 81002 URINALYSIS NONAUTO W/O SCOPE: CPT | Performed by: OBSTETRICS & GYNECOLOGY

## 2022-01-20 PROCEDURE — 0502F SUBSEQUENT PRENATAL CARE: CPT | Performed by: OBSTETRICS & GYNECOLOGY

## 2022-01-20 PROCEDURE — 36415 COLL VENOUS BLD VENIPUNCTURE: CPT

## 2022-01-20 PROCEDURE — 82105 ALPHA-FETOPROTEIN SERUM: CPT

## 2022-01-20 PROCEDURE — 3078F DIAST BP <80 MM HG: CPT | Performed by: OBSTETRICS & GYNECOLOGY

## 2022-01-20 NOTE — PROGRESS NOTES
NATHEN. 19w1d    Doing well. No complaints. Denies abdominal/pelvic pain or vaginal bleeding.    Rh pos   Genetic screening - low risk cfDNA   RTC in 4 weeks   GTT reviewed - order next visit

## 2022-01-22 LAB
AFP SMOKING: NO
FAMILY HX NEURAL TUBE DEFECT: NO
INSULIN REQ MATERNAL DIABETES: NO
MATERNAL AGE OF DELIVERY: 29.6 YR
MOM FOR AFP: 1.17
PATIENT'S AFP: 65 NG/ML

## 2022-01-24 NOTE — PROGRESS NOTES
Outpatient Maternal-Fetal Medicine Consultation    Dear Dr. Sanchez Fields    Thank you for requesting ultrasound evaluation and maternal fetal medicine consultation on your patient Santos Garcia.   As you are aware she is a 36year old female  with a singlet grandfather is unknown. Medications:   Current Outpatient Medications:   •  PRENATAL MULTI +DHA (PNV WITH DHA) 27-0.8-228 MG Oral Cap, Take 1 capsule by mouth daily. , Disp: , Rfl:   •  metoclopramide (REGLAN) 5 MG Oral Tab, Take 1 tablet (5 mg total) The antibody titers tend to be lower than anti-D antibodies and the rate of fetal red cell destruction is proportionately less.  Subclassing of IgG into its four known subclasses is not commonly performed, but the subclass is an important determinant of estimate the severity of fetal anemia.   However, assessing the peak systolic velocity of the middle cerebral artery (MCA) has proven to be a more sensitive and specific test for the detection of severe fetal anemia; further,this test also has the advantage above.     Note to patient and family  The [facility] makes medical notes available to patients in the interest of transparency. However, please be advised that this is a medical document. It is intended as mjbf-ix-kwon communication.   It is

## 2022-01-27 ENCOUNTER — OFFICE VISIT (OUTPATIENT)
Dept: PERINATAL CARE | Facility: HOSPITAL | Age: 32
End: 2022-01-27
Attending: OBSTETRICS & GYNECOLOGY
Payer: COMMERCIAL

## 2022-01-27 VITALS
HEART RATE: 88 BPM | WEIGHT: 145 LBS | HEIGHT: 64.5 IN | DIASTOLIC BLOOD PRESSURE: 79 MMHG | BODY MASS INDEX: 24.45 KG/M2 | SYSTOLIC BLOOD PRESSURE: 122 MMHG

## 2022-01-27 DIAGNOSIS — O36.0910 ANTI-E ISOIMMUNIZATION AFFECTING PREGNANCY IN FIRST TRIMESTER, SINGLE OR UNSPECIFIED FETUS: ICD-10-CM

## 2022-01-27 DIAGNOSIS — O36.0910 ANTI-E ISOIMMUNIZATION AFFECTING PREGNANCY IN FIRST TRIMESTER, SINGLE OR UNSPECIFIED FETUS: Primary | ICD-10-CM

## 2022-01-27 PROCEDURE — 76811 OB US DETAILED SNGL FETUS: CPT | Performed by: OBSTETRICS & GYNECOLOGY

## 2022-01-27 PROCEDURE — 99215 OFFICE O/P EST HI 40 MIN: CPT | Performed by: OBSTETRICS & GYNECOLOGY

## 2022-02-17 ENCOUNTER — ROUTINE PRENATAL (OUTPATIENT)
Dept: OBGYN CLINIC | Facility: CLINIC | Age: 32
End: 2022-02-17
Payer: MEDICAID

## 2022-02-17 VITALS
SYSTOLIC BLOOD PRESSURE: 118 MMHG | BODY MASS INDEX: 24.62 KG/M2 | HEART RATE: 80 BPM | WEIGHT: 146 LBS | DIASTOLIC BLOOD PRESSURE: 70 MMHG | HEIGHT: 64.5 IN

## 2022-02-17 DIAGNOSIS — O36.0910 ANTI-E ISOIMMUNIZATION AFFECTING PREGNANCY IN FIRST TRIMESTER, SINGLE OR UNSPECIFIED FETUS: ICD-10-CM

## 2022-02-17 DIAGNOSIS — Z36.9 PRENATAL SCREENING ENCOUNTER: Primary | ICD-10-CM

## 2022-02-17 LAB
GLUCOSE (URINE DIPSTICK): NEGATIVE MG/DL
PROTEIN (URINE DIPSTICK): NEGATIVE MG/DL

## 2022-02-17 PROCEDURE — 3078F DIAST BP <80 MM HG: CPT | Performed by: OBSTETRICS & GYNECOLOGY

## 2022-02-17 PROCEDURE — 0502F SUBSEQUENT PRENATAL CARE: CPT | Performed by: OBSTETRICS & GYNECOLOGY

## 2022-02-17 PROCEDURE — 81002 URINALYSIS NONAUTO W/O SCOPE: CPT | Performed by: OBSTETRICS & GYNECOLOGY

## 2022-02-17 PROCEDURE — 3008F BODY MASS INDEX DOCD: CPT | Performed by: OBSTETRICS & GYNECOLOGY

## 2022-02-17 PROCEDURE — 3074F SYST BP LT 130 MM HG: CPT | Performed by: OBSTETRICS & GYNECOLOGY

## 2022-02-24 ENCOUNTER — LAB ENCOUNTER (OUTPATIENT)
Dept: LAB | Age: 32
End: 2022-02-24
Attending: OBSTETRICS & GYNECOLOGY
Payer: COMMERCIAL

## 2022-02-24 DIAGNOSIS — Z36.9 PRENATAL SCREENING ENCOUNTER: ICD-10-CM

## 2022-02-24 DIAGNOSIS — O36.0910 ANTI-E ISOIMMUNIZATION AFFECTING PREGNANCY IN FIRST TRIMESTER, SINGLE OR UNSPECIFIED FETUS: ICD-10-CM

## 2022-02-24 LAB
BASOPHILS # BLD AUTO: 0.05 X10(3) UL (ref 0–0.2)
BASOPHILS NFR BLD AUTO: 0.5 %
EOSINOPHIL # BLD AUTO: 0.1 X10(3) UL (ref 0–0.7)
EOSINOPHIL NFR BLD AUTO: 1.1 %
ERYTHROCYTE [DISTWIDTH] IN BLOOD BY AUTOMATED COUNT: 12.9 %
GLUCOSE 1H P GLC SERPL-MCNC: 134 MG/DL
HCT VFR BLD AUTO: 36.7 %
HGB BLD-MCNC: 12.6 G/DL
IMM GRANULOCYTES # BLD AUTO: 0.08 X10(3) UL (ref 0–1)
LYMPHOCYTES # BLD AUTO: 1.86 X10(3) UL (ref 1–4)
LYMPHOCYTES NFR BLD AUTO: 20 %
MCH RBC QN AUTO: 32.3 PG (ref 26–34)
MCHC RBC AUTO-ENTMCNC: 34.3 G/DL (ref 31–37)
MCV RBC AUTO: 94.1 FL
MONOCYTES # BLD AUTO: 0.52 X10(3) UL (ref 0.1–1)
MONOCYTES NFR BLD AUTO: 5.6 %
NEUTROPHILS # BLD AUTO: 6.68 X10 (3) UL (ref 1.5–7.7)
NEUTROPHILS # BLD AUTO: 6.68 X10(3) UL (ref 1.5–7.7)
NEUTROPHILS NFR BLD AUTO: 71.9 %
PLATELET # BLD AUTO: 187 10(3)UL (ref 150–450)
RBC # BLD AUTO: 3.9 X10(6)UL
WBC # BLD AUTO: 9.3 X10(3) UL (ref 4–11)

## 2022-02-24 PROCEDURE — 86886 COOMBS TEST INDIRECT TITER: CPT

## 2022-02-24 PROCEDURE — 82950 GLUCOSE TEST: CPT

## 2022-02-24 PROCEDURE — 36415 COLL VENOUS BLD VENIPUNCTURE: CPT

## 2022-02-24 PROCEDURE — 85025 COMPLETE CBC W/AUTO DIFF WBC: CPT

## 2022-03-10 ENCOUNTER — TELEPHONE (OUTPATIENT)
Dept: OBGYN CLINIC | Facility: CLINIC | Age: 32
End: 2022-03-10

## 2022-03-17 ENCOUNTER — ROUTINE PRENATAL (OUTPATIENT)
Dept: OBGYN CLINIC | Facility: CLINIC | Age: 32
End: 2022-03-17
Payer: MEDICAID

## 2022-03-17 VITALS — DIASTOLIC BLOOD PRESSURE: 80 MMHG | WEIGHT: 151.63 LBS | SYSTOLIC BLOOD PRESSURE: 116 MMHG | BODY MASS INDEX: 26 KG/M2

## 2022-03-17 DIAGNOSIS — Z34.83 ENCOUNTER FOR SUPERVISION OF OTHER NORMAL PREGNANCY IN THIRD TRIMESTER: ICD-10-CM

## 2022-03-17 DIAGNOSIS — Z34.90 PRENATAL CARE, ANTEPARTUM: Primary | ICD-10-CM

## 2022-03-17 DIAGNOSIS — Z23 NEED FOR VACCINATION: ICD-10-CM

## 2022-03-17 DIAGNOSIS — O36.0910 ANTI-E ISOIMMUNIZATION AFFECTING PREGNANCY IN FIRST TRIMESTER, SINGLE OR UNSPECIFIED FETUS: ICD-10-CM

## 2022-03-17 PROBLEM — N91.1 SECONDARY AMENORRHEA: Status: RESOLVED | Noted: 2021-10-31 | Resolved: 2022-03-17

## 2022-03-17 LAB
GLUCOSE (URINE DIPSTICK): NEGATIVE MG/DL
MULTISTIX LOT#: ABNORMAL NUMERIC
PROTEIN (URINE DIPSTICK): 30 MG/DL

## 2022-03-17 PROCEDURE — 3074F SYST BP LT 130 MM HG: CPT | Performed by: OBSTETRICS & GYNECOLOGY

## 2022-03-17 PROCEDURE — 90471 IMMUNIZATION ADMIN: CPT | Performed by: OBSTETRICS & GYNECOLOGY

## 2022-03-17 PROCEDURE — 3079F DIAST BP 80-89 MM HG: CPT | Performed by: OBSTETRICS & GYNECOLOGY

## 2022-03-17 PROCEDURE — 0502F SUBSEQUENT PRENATAL CARE: CPT | Performed by: OBSTETRICS & GYNECOLOGY

## 2022-03-17 PROCEDURE — 90715 TDAP VACCINE 7 YRS/> IM: CPT | Performed by: OBSTETRICS & GYNECOLOGY

## 2022-03-17 PROCEDURE — 81002 URINALYSIS NONAUTO W/O SCOPE: CPT | Performed by: OBSTETRICS & GYNECOLOGY

## 2022-03-17 NOTE — PROGRESS NOTES
[id number] 27w1d seen for routine OB visit. Denies ctx, lof, vb. Reports good FM. 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

## 2022-03-24 ENCOUNTER — LAB ENCOUNTER (OUTPATIENT)
Dept: LAB | Age: 32
End: 2022-03-24
Attending: OBSTETRICS & GYNECOLOGY
Payer: COMMERCIAL

## 2022-03-24 DIAGNOSIS — O36.0910 ANTI-E ISOIMMUNIZATION AFFECTING PREGNANCY IN FIRST TRIMESTER, SINGLE OR UNSPECIFIED FETUS: ICD-10-CM

## 2022-03-24 DIAGNOSIS — Z34.83 ENCOUNTER FOR SUPERVISION OF OTHER NORMAL PREGNANCY IN THIRD TRIMESTER: ICD-10-CM

## 2022-03-24 PROCEDURE — 36415 COLL VENOUS BLD VENIPUNCTURE: CPT

## 2022-03-24 PROCEDURE — 86870 RBC ANTIBODY IDENTIFICATION: CPT

## 2022-03-24 PROCEDURE — 86886 COOMBS TEST INDIRECT TITER: CPT

## 2022-03-24 PROCEDURE — 87389 HIV-1 AG W/HIV-1&-2 AB AG IA: CPT

## 2022-04-07 ENCOUNTER — ROUTINE PRENATAL (OUTPATIENT)
Dept: OBGYN CLINIC | Facility: CLINIC | Age: 32
End: 2022-04-07
Payer: MEDICAID

## 2022-04-07 VITALS
HEART RATE: 73 BPM | WEIGHT: 151 LBS | BODY MASS INDEX: 25.46 KG/M2 | SYSTOLIC BLOOD PRESSURE: 112 MMHG | HEIGHT: 64.5 IN | DIASTOLIC BLOOD PRESSURE: 80 MMHG

## 2022-04-07 DIAGNOSIS — Z34.83 ENCOUNTER FOR SUPERVISION OF OTHER NORMAL PREGNANCY IN THIRD TRIMESTER: ICD-10-CM

## 2022-04-07 DIAGNOSIS — Z3A.30 30 WEEKS GESTATION OF PREGNANCY: Primary | ICD-10-CM

## 2022-04-07 DIAGNOSIS — O36.0930 ANTI-E ISOIMMUNIZATION AFFECTING PREGNANCY IN THIRD TRIMESTER, SINGLE OR UNSPECIFIED FETUS: ICD-10-CM

## 2022-04-07 PROCEDURE — 81002 URINALYSIS NONAUTO W/O SCOPE: CPT | Performed by: NURSE PRACTITIONER

## 2022-04-07 PROCEDURE — 3074F SYST BP LT 130 MM HG: CPT | Performed by: NURSE PRACTITIONER

## 2022-04-07 PROCEDURE — 3008F BODY MASS INDEX DOCD: CPT | Performed by: NURSE PRACTITIONER

## 2022-04-07 PROCEDURE — 0502F SUBSEQUENT PRENATAL CARE: CPT | Performed by: NURSE PRACTITIONER

## 2022-04-07 PROCEDURE — 3079F DIAST BP 80-89 MM HG: CPT | Performed by: NURSE PRACTITIONER

## 2022-04-07 NOTE — PROGRESS NOTES
NATHEN  Doing well, no concerns or questions  GOOD FM  Denies VB/LOF/uctx  Growth US due in 2 weeks with MFM  RTC in 2 wks  Fetal movement discussed

## 2022-04-21 ENCOUNTER — OFFICE VISIT (OUTPATIENT)
Dept: PERINATAL CARE | Facility: HOSPITAL | Age: 32
End: 2022-04-21
Attending: OBSTETRICS & GYNECOLOGY
Payer: COMMERCIAL

## 2022-04-21 ENCOUNTER — TELEPHONE (OUTPATIENT)
Dept: OBGYN CLINIC | Facility: CLINIC | Age: 32
End: 2022-04-21

## 2022-04-21 ENCOUNTER — ROUTINE PRENATAL (OUTPATIENT)
Dept: OBGYN CLINIC | Facility: CLINIC | Age: 32
End: 2022-04-21
Payer: MEDICAID

## 2022-04-21 VITALS
SYSTOLIC BLOOD PRESSURE: 115 MMHG | HEART RATE: 80 BPM | HEIGHT: 64.5 IN | BODY MASS INDEX: 25.64 KG/M2 | WEIGHT: 152 LBS | DIASTOLIC BLOOD PRESSURE: 74 MMHG

## 2022-04-21 VITALS — WEIGHT: 153.13 LBS | SYSTOLIC BLOOD PRESSURE: 106 MMHG | DIASTOLIC BLOOD PRESSURE: 72 MMHG | BODY MASS INDEX: 26 KG/M2

## 2022-04-21 DIAGNOSIS — Z87.59 HISTORY OF POSTPARTUM HEMORRHAGE: ICD-10-CM

## 2022-04-21 DIAGNOSIS — O36.0910 ANTI-E ISOIMMUNIZATION AFFECTING PREGNANCY IN FIRST TRIMESTER, SINGLE OR UNSPECIFIED FETUS: ICD-10-CM

## 2022-04-21 DIAGNOSIS — O36.0910 ANTI-E ISOIMMUNIZATION AFFECTING PREGNANCY IN FIRST TRIMESTER, SINGLE OR UNSPECIFIED FETUS: Primary | ICD-10-CM

## 2022-04-21 DIAGNOSIS — Z34.83 ENCOUNTER FOR SUPERVISION OF OTHER NORMAL PREGNANCY IN THIRD TRIMESTER: Primary | ICD-10-CM

## 2022-04-21 DIAGNOSIS — Z3A.32 32 WEEKS GESTATION OF PREGNANCY: ICD-10-CM

## 2022-04-21 DIAGNOSIS — O36.0930 ANTI-E ISOIMMUNIZATION AFFECTING PREGNANCY IN THIRD TRIMESTER, SINGLE OR UNSPECIFIED FETUS: ICD-10-CM

## 2022-04-21 PROCEDURE — 3074F SYST BP LT 130 MM HG: CPT | Performed by: OBSTETRICS & GYNECOLOGY

## 2022-04-21 PROCEDURE — 0502F SUBSEQUENT PRENATAL CARE: CPT | Performed by: OBSTETRICS & GYNECOLOGY

## 2022-04-21 PROCEDURE — 76816 OB US FOLLOW-UP PER FETUS: CPT | Performed by: OBSTETRICS & GYNECOLOGY

## 2022-04-21 PROCEDURE — 81002 URINALYSIS NONAUTO W/O SCOPE: CPT | Performed by: OBSTETRICS & GYNECOLOGY

## 2022-04-21 PROCEDURE — 3078F DIAST BP <80 MM HG: CPT | Performed by: OBSTETRICS & GYNECOLOGY

## 2022-04-21 PROCEDURE — 76819 FETAL BIOPHYS PROFIL W/O NST: CPT

## 2022-04-21 NOTE — TELEPHONE ENCOUNTER
RECEIVED FMLA FROM [place] [employer]   PATIENT PAID $25 FORMS FAXED TO [facility] FOR COMPLETION     PLEASE FAX TO [phone] WHEN COMPLETED

## 2022-04-21 NOTE — PROGRESS NOTES
NATHEN 32w1d    Doing well, +FM  Intermittent chin hernandez contractions that resolve with rest  No LOF, VB  Works nights in ED as tech, has been busy    1. FHT-present  2. PNL:  HIV neg  3. Anti-E ab: has appt with MFM today for growth US. Await recs  4. Mode of delivery:  anticipated   5. Immunizations: s/p TDAP  6. Reviewed labor precautions  7.  H/o PPH, multiparity: Hb 12 on last check, uterotonics in room for delivery    Return in 2 weeks

## 2022-04-28 ENCOUNTER — LAB ENCOUNTER (OUTPATIENT)
Dept: LAB | Age: 32
End: 2022-04-28
Attending: NURSE PRACTITIONER
Payer: COMMERCIAL

## 2022-04-28 DIAGNOSIS — O36.0910 ANTI-E ISOIMMUNIZATION AFFECTING PREGNANCY IN FIRST TRIMESTER, SINGLE OR UNSPECIFIED FETUS: ICD-10-CM

## 2022-04-28 LAB — ANTIBODY SCREEN: POSITIVE

## 2022-04-28 PROCEDURE — 86850 RBC ANTIBODY SCREEN: CPT

## 2022-04-28 PROCEDURE — 86886 COOMBS TEST INDIRECT TITER: CPT

## 2022-04-28 PROCEDURE — 86870 RBC ANTIBODY IDENTIFICATION: CPT

## 2022-05-02 NOTE — TELEPHONE ENCOUNTER
lvm for pt fmla paperwork is ready. Provider signed and I faxed to number provided    Copy sent to scanning. Copy in [place].

## 2022-05-05 ENCOUNTER — ROUTINE PRENATAL (OUTPATIENT)
Dept: OBGYN CLINIC | Facility: CLINIC | Age: 32
End: 2022-05-05
Payer: MEDICAID

## 2022-05-05 VITALS
SYSTOLIC BLOOD PRESSURE: 110 MMHG | BODY MASS INDEX: 26.02 KG/M2 | DIASTOLIC BLOOD PRESSURE: 78 MMHG | HEIGHT: 64.5 IN | WEIGHT: 154.25 LBS | HEART RATE: 83 BPM

## 2022-05-05 DIAGNOSIS — Z3A.34 34 WEEKS GESTATION OF PREGNANCY: Primary | ICD-10-CM

## 2022-05-05 DIAGNOSIS — Z34.83 ENCOUNTER FOR SUPERVISION OF OTHER NORMAL PREGNANCY IN THIRD TRIMESTER: ICD-10-CM

## 2022-05-05 LAB
GLUCOSE (URINE DIPSTICK): NEGATIVE MG/DL
MULTISTIX LOT#: NORMAL NUMERIC

## 2022-05-05 PROCEDURE — 81002 URINALYSIS NONAUTO W/O SCOPE: CPT | Performed by: NURSE PRACTITIONER

## 2022-05-05 PROCEDURE — 3078F DIAST BP <80 MM HG: CPT | Performed by: NURSE PRACTITIONER

## 2022-05-05 PROCEDURE — 0502F SUBSEQUENT PRENATAL CARE: CPT | Performed by: NURSE PRACTITIONER

## 2022-05-05 PROCEDURE — 3008F BODY MASS INDEX DOCD: CPT | Performed by: NURSE PRACTITIONER

## 2022-05-05 PROCEDURE — 3074F SYST BP LT 130 MM HG: CPT | Performed by: NURSE PRACTITIONER

## 2022-05-05 NOTE — PROGRESS NOTES
NATHEN  Doing well, some pelvic pain otherwise doing ok  GOOD FM  Denies VB/LOF/uctx  RTC in 2 wks/ will need antibody titres ordered at that visit  Fetal movement instructions given

## 2022-05-10 ENCOUNTER — TELEPHONE (OUTPATIENT)
Dept: OBGYN CLINIC | Facility: CLINIC | Age: 32
End: 2022-05-10

## 2022-05-11 NOTE — TELEPHONE ENCOUNTER
Call to patient; no answer. Left message to call back.        G4/P 3 GA 35 wks patient   Last OV: 5/5/22 rigoberto with David Epperson   Pregnancy Complications: hx of postpartum depression, hx of postpartum hemorrhage, anti-E isoimmunization

## 2022-05-19 ENCOUNTER — ROUTINE PRENATAL (OUTPATIENT)
Dept: OBGYN CLINIC | Facility: CLINIC | Age: 32
End: 2022-05-19
Payer: COMMERCIAL

## 2022-05-19 VITALS — SYSTOLIC BLOOD PRESSURE: 110 MMHG | DIASTOLIC BLOOD PRESSURE: 70 MMHG | BODY MASS INDEX: 27 KG/M2 | WEIGHT: 157.63 LBS

## 2022-05-19 DIAGNOSIS — O36.0910 ANTI-E ISOIMMUNIZATION AFFECTING PREGNANCY IN FIRST TRIMESTER, SINGLE OR UNSPECIFIED FETUS: ICD-10-CM

## 2022-05-19 DIAGNOSIS — Z34.80 PRENATAL CARE OF MULTIGRAVIDA, ANTEPARTUM: Primary | ICD-10-CM

## 2022-05-19 PROCEDURE — 87081 CULTURE SCREEN ONLY: CPT | Performed by: OBSTETRICS & GYNECOLOGY

## 2022-05-19 PROCEDURE — 87653 STREP B DNA AMP PROBE: CPT | Performed by: OBSTETRICS & GYNECOLOGY

## 2022-05-19 NOTE — PROGRESS NOTES
NATHEN 36w1d    Doing ok. This AM she slipped and fell onto her knee getting out of the shower. No direct abdominal trauma. Called but did not get a call back. +FM  No contractions  No LOF, VB    1. FHT-present. We reviewed warning si/sx after her fall. It has now been 4 hours since her fall and no symptoms so will defer further workup as no direct abdominal trauma. We reviewed kick counts, warning si/sx. 2. PNL:  GBS collected today  3. Mode of delivery:  anticipated  4. Anti-E antibody: ordered repeat titers, s/p MFM consultation and growth US. Have been low. If this repeat is stable will not need further evaluation   5.  Immunizations: s/p TDAP    Return in 1 weeks

## 2022-05-21 LAB — GROUP B STREP BY PCR FOR PCR OVT: NEGATIVE

## 2022-05-27 ENCOUNTER — LAB ENCOUNTER (OUTPATIENT)
Dept: LAB | Age: 32
End: 2022-05-27
Attending: OBSTETRICS & GYNECOLOGY
Payer: COMMERCIAL

## 2022-05-27 ENCOUNTER — ROUTINE PRENATAL (OUTPATIENT)
Dept: OBGYN CLINIC | Facility: CLINIC | Age: 32
End: 2022-05-27
Payer: MEDICAID

## 2022-05-27 ENCOUNTER — TELEPHONE (OUTPATIENT)
Dept: OBGYN CLINIC | Facility: CLINIC | Age: 32
End: 2022-05-27

## 2022-05-27 VITALS — WEIGHT: 158.63 LBS | SYSTOLIC BLOOD PRESSURE: 118 MMHG | BODY MASS INDEX: 27 KG/M2 | DIASTOLIC BLOOD PRESSURE: 76 MMHG

## 2022-05-27 DIAGNOSIS — Z20.822 ENCOUNTER FOR PREPROCEDURE SCREENING LABORATORY TESTING FOR COVID-19: Primary | ICD-10-CM

## 2022-05-27 DIAGNOSIS — Z3A.37 37 WEEKS GESTATION OF PREGNANCY: Primary | ICD-10-CM

## 2022-05-27 DIAGNOSIS — O36.0910 ANTI-E ISOIMMUNIZATION AFFECTING PREGNANCY IN FIRST TRIMESTER, SINGLE OR UNSPECIFIED FETUS: ICD-10-CM

## 2022-05-27 DIAGNOSIS — Z01.812 ENCOUNTER FOR PREPROCEDURE SCREENING LABORATORY TESTING FOR COVID-19: Primary | ICD-10-CM

## 2022-05-27 PROCEDURE — 86870 RBC ANTIBODY IDENTIFICATION: CPT

## 2022-05-27 PROCEDURE — 81002 URINALYSIS NONAUTO W/O SCOPE: CPT | Performed by: OBSTETRICS & GYNECOLOGY

## 2022-05-27 PROCEDURE — 86850 RBC ANTIBODY SCREEN: CPT

## 2022-05-27 PROCEDURE — 86886 COOMBS TEST INDIRECT TITER: CPT

## 2022-05-27 PROCEDURE — 3078F DIAST BP <80 MM HG: CPT | Performed by: OBSTETRICS & GYNECOLOGY

## 2022-05-27 PROCEDURE — 3074F SYST BP LT 130 MM HG: CPT | Performed by: OBSTETRICS & GYNECOLOGY

## 2022-05-27 PROCEDURE — 0502F SUBSEQUENT PRENATAL CARE: CPT | Performed by: OBSTETRICS & GYNECOLOGY

## 2022-05-27 NOTE — TELEPHONE ENCOUNTER
----- Message from Zina Sorenson MD sent at 5/27/2022 12:22 PM CDT -----  Regarding: induction  Pt scheduled for IOL 6/9 at 9pm. Please add to calendar.

## 2022-05-27 NOTE — PROGRESS NOTES
[id number] 24P1Y seen for routine OB visit. Denies ctx, lof, vb. Reports good FM. Patient Active Problem List:     History of postpartum depression     History of postpartum hemorrhage     Anti-E isoimmunization affecting pregnancy in third trimester     Supervision of normal pregnancy       Gen: AAOx3, NAD  Resp: breathing comfortably  Abdomen: gravid, soft, nontender  Ext: nontender, no edema  SVE: 1/th/hi    Plan:  - anti-E titer in process from this morning  - GBS neg  - requests 39wk IOL - scheduled 6/9 at 9pm  - return precautions reviewed    RTO in 1 week(s).

## 2022-05-30 ENCOUNTER — MOBILE ENCOUNTER (OUTPATIENT)
Dept: OBGYN CLINIC | Facility: CLINIC | Age: 32
End: 2022-05-30

## 2022-05-30 ENCOUNTER — HOSPITAL ENCOUNTER (INPATIENT)
Facility: HOSPITAL | Age: 32
LOS: 2 days | Discharge: HOME | End: 2022-06-02
Attending: OBSTETRICS & GYNECOLOGY | Admitting: OBSTETRICS & GYNECOLOGY
Payer: COMMERCIAL

## 2022-05-31 ENCOUNTER — ANESTHESIA (OUTPATIENT)
Dept: OBGYN UNIT | Facility: HOSPITAL | Age: 32
End: 2022-05-31
Payer: COMMERCIAL

## 2022-05-31 ENCOUNTER — ANESTHESIA EVENT (OUTPATIENT)
Dept: OBGYN UNIT | Facility: HOSPITAL | Age: 32
End: 2022-05-31
Payer: COMMERCIAL

## 2022-05-31 PROBLEM — Z34.90 PREGNANCY (HCC): Status: ACTIVE | Noted: 2022-05-31

## 2022-05-31 PROBLEM — Z34.90 PREGNANCY: Status: ACTIVE | Noted: 2022-05-31

## 2022-05-31 PROBLEM — Z37.9 NORMAL LABOR (HCC): Status: ACTIVE | Noted: 2022-05-31

## 2022-05-31 PROBLEM — Z37.9 NORMAL LABOR: Status: ACTIVE | Noted: 2022-05-31

## 2022-05-31 LAB
ANTIBODY SCREEN: POSITIVE
BASOPHILS # BLD AUTO: 0.04 X10(3) UL (ref 0–0.2)
BASOPHILS NFR BLD AUTO: 0.3 %
EOSINOPHIL # BLD AUTO: 0.03 X10(3) UL (ref 0–0.7)
EOSINOPHIL NFR BLD AUTO: 0.3 %
ERYTHROCYTE [DISTWIDTH] IN BLOOD BY AUTOMATED COUNT: 12.9 %
HCT VFR BLD AUTO: 38 %
HGB BLD-MCNC: 12.5 G/DL
IMM GRANULOCYTES # BLD AUTO: 0.08 X10(3) UL (ref 0–1)
IMM GRANULOCYTES NFR BLD: 0.7 %
LYMPHOCYTES # BLD AUTO: 2.14 X10(3) UL (ref 1–4)
LYMPHOCYTES NFR BLD AUTO: 18.2 %
MCH RBC QN AUTO: 28.3 PG (ref 26–34)
MCHC RBC AUTO-ENTMCNC: 32.9 G/DL (ref 31–37)
MCV RBC AUTO: 86.2 FL
MONOCYTES # BLD AUTO: 0.64 X10(3) UL (ref 0.1–1)
MONOCYTES NFR BLD AUTO: 5.4 %
NEUTROPHILS # BLD AUTO: 8.86 X10 (3) UL (ref 1.5–7.7)
NEUTROPHILS # BLD AUTO: 8.86 X10(3) UL (ref 1.5–7.7)
NEUTROPHILS NFR BLD AUTO: 75.1 %
PLATELET # BLD AUTO: 201 10(3)UL (ref 150–450)
RBC # BLD AUTO: 4.41 X10(6)UL
RH BLOOD TYPE: POSITIVE
SARS-COV-2 RNA RESP QL NAA+PROBE: NOT DETECTED
T PALLIDUM AB SER QL IA: NONREACTIVE
WBC # BLD AUTO: 11.8 X10(3) UL (ref 4–11)

## 2022-05-31 PROCEDURE — 59409 OBSTETRICAL CARE: CPT | Performed by: OBSTETRICS & GYNECOLOGY

## 2022-05-31 RX ORDER — BUPIVACAINE HCL/0.9 % NACL/PF 0.25 %
5 PLASTIC BAG, INJECTION (ML) EPIDURAL AS NEEDED
Status: DISCONTINUED | OUTPATIENT
Start: 2022-05-31 | End: 2022-05-31

## 2022-05-31 RX ORDER — AMMONIA INHALANTS 0.04 G/.3ML
0.3 INHALANT RESPIRATORY (INHALATION) AS NEEDED
Status: DISCONTINUED | OUTPATIENT
Start: 2022-05-31 | End: 2022-05-31 | Stop reason: HOSPADM

## 2022-05-31 RX ORDER — SIMETHICONE 80 MG
80 TABLET,CHEWABLE ORAL 3 TIMES DAILY PRN
Status: DISCONTINUED | OUTPATIENT
Start: 2022-05-31 | End: 2022-06-02

## 2022-05-31 RX ORDER — ACETAMINOPHEN 500 MG
500 TABLET ORAL EVERY 6 HOURS PRN
Status: DISCONTINUED | OUTPATIENT
Start: 2022-05-31 | End: 2022-06-02

## 2022-05-31 RX ORDER — BISACODYL 10 MG
10 SUPPOSITORY, RECTAL RECTAL ONCE AS NEEDED
Status: DISCONTINUED | OUTPATIENT
Start: 2022-05-31 | End: 2022-06-02

## 2022-05-31 RX ORDER — TERBUTALINE SULFATE 1 MG/ML
0.25 INJECTION, SOLUTION SUBCUTANEOUS AS NEEDED
Status: DISCONTINUED | OUTPATIENT
Start: 2022-05-31 | End: 2022-05-31 | Stop reason: HOSPADM

## 2022-05-31 RX ORDER — ACETAMINOPHEN 500 MG
1000 TABLET ORAL EVERY 6 HOURS PRN
Status: DISCONTINUED | OUTPATIENT
Start: 2022-05-31 | End: 2022-06-02

## 2022-05-31 RX ORDER — NALBUPHINE HCL 10 MG/ML
2.5 AMPUL (ML) INJECTION
Status: DISCONTINUED | OUTPATIENT
Start: 2022-05-31 | End: 2022-05-31

## 2022-05-31 RX ORDER — IBUPROFEN 600 MG/1
600 TABLET ORAL EVERY 6 HOURS PRN
Status: DISCONTINUED | OUTPATIENT
Start: 2022-05-31 | End: 2022-05-31 | Stop reason: HOSPADM

## 2022-05-31 RX ORDER — IBUPROFEN 600 MG/1
600 TABLET ORAL EVERY 6 HOURS
Status: DISCONTINUED | OUTPATIENT
Start: 2022-05-31 | End: 2022-06-02

## 2022-05-31 RX ORDER — ACETAMINOPHEN 500 MG
500 TABLET ORAL EVERY 6 HOURS PRN
Status: DISCONTINUED | OUTPATIENT
Start: 2022-05-31 | End: 2022-05-31 | Stop reason: HOSPADM

## 2022-05-31 RX ORDER — SODIUM CHLORIDE, SODIUM LACTATE, POTASSIUM CHLORIDE, CALCIUM CHLORIDE 600; 310; 30; 20 MG/100ML; MG/100ML; MG/100ML; MG/100ML
INJECTION, SOLUTION INTRAVENOUS CONTINUOUS
Status: DISCONTINUED | OUTPATIENT
Start: 2022-05-31 | End: 2022-05-31 | Stop reason: HOSPADM

## 2022-05-31 RX ORDER — DOCUSATE SODIUM 100 MG/1
100 CAPSULE, LIQUID FILLED ORAL
Status: DISCONTINUED | OUTPATIENT
Start: 2022-05-31 | End: 2022-06-02

## 2022-05-31 RX ORDER — DEXTROSE, SODIUM CHLORIDE, SODIUM LACTATE, POTASSIUM CHLORIDE, AND CALCIUM CHLORIDE 5; .6; .31; .03; .02 G/100ML; G/100ML; G/100ML; G/100ML; G/100ML
INJECTION, SOLUTION INTRAVENOUS AS NEEDED
Status: DISCONTINUED | OUTPATIENT
Start: 2022-05-31 | End: 2022-05-31 | Stop reason: HOSPADM

## 2022-05-31 RX ORDER — HYDROMORPHONE HYDROCHLORIDE 1 MG/ML
1 INJECTION, SOLUTION INTRAMUSCULAR; INTRAVENOUS; SUBCUTANEOUS
Status: DISCONTINUED | OUTPATIENT
Start: 2022-05-31 | End: 2022-05-31 | Stop reason: HOSPADM

## 2022-05-31 RX ORDER — CHOLECALCIFEROL (VITAMIN D3) 25 MCG
1 TABLET,CHEWABLE ORAL DAILY
Status: DISCONTINUED | OUTPATIENT
Start: 2022-05-31 | End: 2022-06-02

## 2022-05-31 RX ORDER — TRISODIUM CITRATE DIHYDRATE AND CITRIC ACID MONOHYDRATE 500; 334 MG/5ML; MG/5ML
30 SOLUTION ORAL AS NEEDED
Status: DISCONTINUED | OUTPATIENT
Start: 2022-05-31 | End: 2022-05-31 | Stop reason: HOSPADM

## 2022-05-31 RX ORDER — ONDANSETRON 2 MG/ML
4 INJECTION INTRAMUSCULAR; INTRAVENOUS EVERY 6 HOURS PRN
Status: DISCONTINUED | OUTPATIENT
Start: 2022-05-31 | End: 2022-05-31 | Stop reason: HOSPADM

## 2022-05-31 NOTE — PROGRESS NOTES
Returned patient's page. Patient reports that the contractions that have been present for the past few days. However she reports that her contractions are progressed from every 10 minutes to an hour every 5 to 7 minutes apart lasting a few hours. She states that her contractions also have increased in severity. She denies leakage of fluid or vaginal bleeding. Good fetal movement. The patient was advised to report to labor and  delivery for further evaluation. 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  Patient agreeable with plan. All question concerns were addressed.

## 2022-05-31 NOTE — PLAN OF CARE
Problem: BIRTH - VAGINAL/ SECTION  Goal: Fetal and maternal status remain reassuring during the birth process  Description: INTERVENTIONS:  - Monitor vital signs  - Monitor fetal heart rate  - Monitor uterine activity  - Monitor labor progression (vaginal delivery)  - DVT prophylaxis (C/S delivery)  - Surgical antibiotic prophylaxis (C/S delivery)  Outcome: Progressing     Problem: PAIN - ADULT  Goal: Verbalizes/displays adequate comfort level or patient's stated pain goal  Description: INTERVENTIONS:  - Encourage pt to monitor pain and request assistance  - Assess pain using appropriate pain scale  - Administer analgesics based on type and severity of pain and evaluate response  - Implement non-pharmacological measures as appropriate and evaluate response  - Consider cultural and social influences on pain and pain management  - Manage/alleviate anxiety  - Utilize distraction and/or relaxation techniques  - Monitor for opioid side effects  - Notify MD/LIP if interventions unsuccessful or patient reports new pain  - Anticipate increased pain with activity and pre-medicate as appropriate  Outcome: Progressing     Problem: ANXIETY  Goal: Will report anxiety at manageable levels  Description: INTERVENTIONS:  - Administer medication as ordered  - Teach and rehearse alternative coping skills  - Provide emotional support with 1:1 interaction with staff  Outcome: Progressing     Problem: Patient/Family Goals  Goal: Patient/Family Long Term Goal  Description: Patient's Long Term Goal: Safe uncomplicated delivery    Interventions:  - See additional Care Plan goals for specific interventions  Outcome: Progressing  Goal: Patient/Family Short Term Goal  Description: Patient's Short Term Goal: Adequate pain control    Interventions:   - See additional Care Plan goals for specific interventions  Outcome: Progressing     Problem: SAFETY ADULT - FALL  Goal: Free from fall injury  Description: INTERVENTIONS:  - Assess pt frequently for physical needs  - Identify cognitive and physical deficits and behaviors that affect risk of falls.   - Fishtail fall precautions as indicated by assessment.  - Educate pt/family on patient safety including physical limitations  - Instruct pt to call for assistance with activity based on assessment  - Modify environment to reduce risk of injury  - Provide assistive devices as appropriate  - Consider OT/PT consult to assist with strengthening/mobility  - Encourage toileting schedule  Outcome: Progressing

## 2022-05-31 NOTE — PROGRESS NOTES
36year old  at 37+5 weeks gestation presents to triage with c/o contractions that began at approx 6pm tonight. Pt was at work and they became closer together and stronger. Pt denies leaking of fluid or vaginal bleeding and reports good fetal movement. Plan of care discussed with patient.

## 2022-05-31 NOTE — L&D DELIVERY NOTE
Rudi Common [[id number]]    Labor Events     labor?: No   steroids?: None  Antibiotics received during labor?: No  Antibiotics (enter # doses in comment): none  Rupture date/time: 2022 0658     Rupture type: SROM  Fluid color: Clear  Induction: None  Augmentation: Oxytocin  Indications for augmentation: Ineffective Contraction Pattern     Labor Event Times    Labor onset date/time: 2022 1800     Home Presentation    Position: Left Occiput Anterior     Operative Delivery    Operative Vaginal Delivery: No            Shoulder Dystocia    Shoulder Dystocia: No     Anesthesia    Method: Epidural   Analgesics:  Analgesics   DILAUDID 1 MG/ML IJ SOLN          Delivery    Delivery date/time:  22 09:22:00   Delivery type: Normal spontaneous vaginal delivery    Details:     Delivery location: delivery room     Delivery Providers    Delivering Clinician: Nancy Castanon MD   Delivery personnel:  Provider Role   Liberty Shelley RN Baby Nurse   Felipa Mariee RN Delivery Nurse         Cord    No data filed     Home Measurements    Weight: 3410 g 7 lb 8.3 oz          Placenta    Date/time: 2022 0926     Apgars    Living status: Living   Apgar Scoring Key:    0 1 2    Skin color Blue or pale Acrocyanotic Completely pink    Heart rate Absent <100 bpm >100 bpm    Reflex irritability No response Grimace Cry or active withdrawal    Muscle tone Limp Some flexion Active motion    Respiratory effort Absent Weak cry; hypoventilation Good, crying              1 Minute:  5 Minute:  10 Minute:  15 Minute:  20 Minute:    Skin color: 1  1       Heart rate: 2  2       Reflex irritablity: 2  2       Muscle tone: 2  2       Respiratory effort: 2  2       Total: 9  9          Apgars assigned by: WON VARELA RN   disposition: with mother     Skin to Skin    No data filed     Vaginal Count    No data filed     Delivery (Maternal)    Episiotomy: None            [facility]  Delivery Note    Jud Barroso Patient Status:  Inpatient    10/25/1990 MRN [id number]   Location [facility] Attending Amie Nuñez MD   [facility] Day # 1 PCP None Pcp     Date of Delivery: 22    Pre Op Diagnosis:  IUP at Term, labor, anti-E isoimmunization    Post Op Diagnosis: Same - delivered    Procedure: Normal Spontaneous Vaginal Delivery    Surgeon: Stephanie Shah MD      Anesthesia: Epidural    EBL:  See QBL Quantitative Blood Loss (mL)         Findings:    Sex: male     Weight: 7#8oz   3410 g      Apgars: 9/9   \"Mikey\"    Episiotomy: none  Laceration: none    This patient is a 36year old year old female  at 37w6d weeks of gestation who presented for labor. Prenatal care was unremarkable. Labor course was unremarkable. The patient progressed to complete and began pushing. After approximately 1 minutes, the patient had brought the vertex presentation down to the perineum. The head was delivered over the perineum in vertex presentation SHERICE. There was no nuchal cord. The anterior and then the posterior shoulder were delivered without difficulty and the rest of the body followed easily. The infant was placed on the mother's abdomen and after a period of observation with nursing assessment, the umbilical cord was doubly clamped and transected. The placenta was then delivered spontaneously intact. There was good hemostasis with IV Pitocin and uterine massage. The infant and mother were stable for normal post partum recovery. Nursery staff notified regarding anti-E isoimmunization. Sponge, instrument and needle counts were correct at the end of the procedure.     Stephanie Shah MD  [address] [place] [address] Gynecology

## 2022-05-31 NOTE — PLAN OF CARE
Problem: BIRTH - VAGINAL/ SECTION  Goal: Fetal and maternal status remain reassuring during the birth process  Description: INTERVENTIONS:  - Monitor vital signs  - Monitor fetal heart rate  - Monitor uterine activity  - Monitor labor progression (vaginal delivery)  - DVT prophylaxis (C/S delivery)  - Surgical antibiotic prophylaxis (C/S delivery)  2022 1056 by Barbara Freeman RN  Outcome: Completed  2022 by Barbara Freeman RN  Outcome: Progressing     Problem: PAIN - ADULT  Goal: Verbalizes/displays adequate comfort level or patient's stated pain goal  Description: INTERVENTIONS:  - Encourage pt to monitor pain and request assistance  - Assess pain using appropriate pain scale  - Administer analgesics based on type and severity of pain and evaluate response  - Implement non-pharmacological measures as appropriate and evaluate response  - Consider cultural and social influences on pain and pain management  - Manage/alleviate anxiety  - Utilize distraction and/or relaxation techniques  - Monitor for opioid side effects  - Notify MD/LIP if interventions unsuccessful or patient reports new pain  - Anticipate increased pain with activity and pre-medicate as appropriate  2022 105 by Barbara Freeman RN  Outcome: Completed  2022 by Barbara Freeman RN  Outcome: Progressing     Problem: ANXIETY  Goal: Will report anxiety at manageable levels  Description: INTERVENTIONS:  - Administer medication as ordered  - Teach and rehearse alternative coping skills  - Provide emotional support with 1:1 interaction with staff  2022 by Barbara Freeman RN  Outcome: Completed  2022 by Barbara Freeman RN  Outcome: Progressing     Problem: Patient/Family Goals  Goal: Patient/Family Long Term Goal  Description: Patient's Long Term Goal: Safe uncomplicated delivery    Interventions:  - See additional Care Plan goals for specific interventions  2022 105 by Lamar Gonzalez Lilian Rhoades RN  Outcome: Completed  5/31/2022 0726 by Princella Kehr, RN  Outcome: Progressing  Goal: Patient/Family Short Term Goal  Description: Patient's Short Term Goal: Adequate pain control    Interventions:   - See additional Care Plan goals for specific interventions  5/31/2022 1056 by Princella Kehr, RN  Outcome: Completed  5/31/2022 0726 by Princella Kehr, RN  Outcome: Progressing     Problem: SAFETY ADULT - FALL  Goal: Free from fall injury  Description: INTERVENTIONS:  - Assess pt frequently for physical needs  - Identify cognitive and physical deficits and behaviors that affect risk of falls.   - Muddy fall precautions as indicated by assessment.  - Educate pt/family on patient safety including physical limitations  - Instruct pt to call for assistance with activity based on assessment  - Modify environment to reduce risk of injury  - Provide assistive devices as appropriate  - Consider OT/PT consult to assist with strengthening/mobility  - Encourage toileting schedule  5/31/2022 1056 by Princella Kehr, RN  Outcome: Completed  5/31/2022 0726 by Princella Kehr, RN  Outcome: Progressing

## 2022-05-31 NOTE — ANESTHESIA PROCEDURE NOTES
Labor Analgesia  Performed by: Amalia Viramontes MD  Authorized by: Amalia Viramontes MD       General Information and Staff    Start Time:  5/31/2022 5:57 AM  End Time:  5/31/2022 6:03 AM  Anesthesiologist:  Amalia Viramontes MD  Performed by: Anesthesiologist  Patient Location:  OB  Site Identification: surface landmarks    Reason for Block: labor epidural    Preanesthetic Checklist: patient identified, IV checked, risks and benefits discussed, monitors and equipment checked, pre-op evaluation, timeout performed, IV bolus, anesthesia consent and sterile technique used      Procedure Details    Patient Position:  Sitting  Prep: ChloraPrep    Monitoring:  Heart rate and continuous pulse ox  Approach:  Midline    Epidural Needle    Injection Technique:   Needle Type: Tuohy  Needle Gauge:  17 G  Needle Length:  3.375 in  Needle Insertion Depth:  4    Spinal Needle      Catheter    Catheter Type:  End hole  Catheter Size:  19 G  Catheter at Skin Depth:  9  Test Dose:  Negative    Assessment  Sensory Level:  T10    Additional Comments     Patient in sitting position, assisted by RN; hands sterilized with alcohol gel, sterile gloves, mask, and hat worn, sterile prep (chloraprep) and drape applied, +CECY to air @ L4/L5, epidural catheter threaded easily, and secured to patients' back, patient denied any paraesthesias, no froilan blood or CSF aspirated; negative test dose; epidural dosed without significant hypotension; no immediate complications observed. Patient tolerated procedure well.     No test dose available due to supply shortage    Epidural bolused with: bupivacaine 0.25% 10mL and fentanyl 100 mcg    Fentanyl 2 mc/ml + Ropivicaine 0.15% epidural infusion at 10cc/hr

## 2022-05-31 NOTE — PLAN OF CARE
Problem: BIRTH - VAGINAL/ SECTION  Goal: Fetal and maternal status remain reassuring during the birth process  Description: INTERVENTIONS:  - Monitor vital signs  - Monitor fetal heart rate  - Monitor uterine activity  - Monitor labor progression (vaginal delivery)  - DVT prophylaxis (C/S delivery)  - Surgical antibiotic prophylaxis (C/S delivery)  Outcome: Progressing     Problem: PAIN - ADULT  Goal: Verbalizes/displays adequate comfort level or patient's stated pain goal  Description: INTERVENTIONS:  - Encourage pt to monitor pain and request assistance  - Assess pain using appropriate pain scale  - Administer analgesics based on type and severity of pain and evaluate response  - Implement non-pharmacological measures as appropriate and evaluate response  - Consider cultural and social influences on pain and pain management  - Manage/alleviate anxiety  - Utilize distraction and/or relaxation techniques  - Monitor for opioid side effects  - Notify MD/LIP if interventions unsuccessful or patient reports new pain  - Anticipate increased pain with activity and pre-medicate as appropriate  Outcome: Progressing     Problem: ANXIETY  Goal: Will report anxiety at manageable levels  Description: INTERVENTIONS:  - Administer medication as ordered  - Teach and rehearse alternative coping skills  - Provide emotional support with 1:1 interaction with staff  Outcome: Progressing     Problem: Patient/Family Goals  Goal: Patient/Family Long Term Goal  Description: Patient's Long Term Goal: Safe uncomplicated delivery    Interventions:  - See additional Care Plan goals for specific interventions  Outcome: Progressing  Goal: Patient/Family Short Term Goal  Description: Patient's Short Term Goal: Adequate pain control    Interventions:   - See additional Care Plan goals for specific interventions  Outcome: Progressing

## 2022-06-01 PROBLEM — Z37.9 NORMAL LABOR (HCC): Status: RESOLVED | Noted: 2022-05-31 | Resolved: 2022-06-01

## 2022-06-01 PROBLEM — Z37.9 NORMAL LABOR: Status: RESOLVED | Noted: 2022-05-31 | Resolved: 2022-06-01

## 2022-06-01 LAB
HCT VFR BLD AUTO: 30.3 %
HGB BLD-MCNC: 9.7 G/DL

## 2022-06-01 RX ORDER — MELATONIN
325
Status: DISCONTINUED | OUTPATIENT
Start: 2022-06-01 | End: 2022-06-02

## 2022-06-01 RX ORDER — ACETAMINOPHEN, ASPIRIN AND CAFFEINE 250; 250; 65 MG/1; MG/1; MG/1
1 TABLET, FILM COATED ORAL EVERY 4 HOURS PRN
Status: DISCONTINUED | OUTPATIENT
Start: 2022-06-01 | End: 2022-06-02

## 2022-06-01 NOTE — CM/SW NOTE
06/01/22 1200   Referral Data   Referral Source Nurse   Referral Reason Psychosocial assessment   OTHER   Post-partum risk assessment score 2     SW met with patient, acknowledging order. Case reviewed with RN, Maria Luisa Rojas. Patient presented with cheerful affect.  at bedside. Patient and  live in [place], with their 3 children, this is their 1th. Patient reports PPD after having her 3 child (only daughter). Patient reported he had a high risk pregnancy with her Daughter and developed PPD. Patient reported, due to covid, she was unable to see a therapist or pschiatrist at that time. Patient reports she \"got through it\" on her own. Patient reported no current symptoms of depression or anxiety. SW encouraged patient to reach out to her OBGYN or PCP with any further PPD/PPA concerns. SW reviewed Postpartum Depression warning signs and support services. Resource given. Patient reported no further concerns at this time.     Queen Duane LCSW  /Discharge Planner

## 2022-06-02 VITALS
HEART RATE: 77 BPM | SYSTOLIC BLOOD PRESSURE: 124 MMHG | HEIGHT: 64 IN | WEIGHT: 157 LBS | RESPIRATION RATE: 16 BRPM | TEMPERATURE: 98 F | DIASTOLIC BLOOD PRESSURE: 80 MMHG | OXYGEN SATURATION: 96 % | BODY MASS INDEX: 26.8 KG/M2

## 2022-06-02 RX ORDER — MELATONIN
Qty: 90 TABLET | Refills: 0 | OUTPATIENT
Start: 2022-06-02

## 2022-06-02 RX ORDER — MELATONIN
325
Qty: 30 TABLET | Refills: 0 | Status: SHIPPED | OUTPATIENT
Start: 2022-06-03

## 2022-06-02 RX ORDER — ACETAMINOPHEN 500 MG
500 TABLET ORAL EVERY 6 HOURS PRN
Qty: 60 TABLET | Refills: 0 | Status: SHIPPED | OUTPATIENT
Start: 2022-06-02

## 2022-06-02 RX ORDER — BUTALBITAL, ACETAMINOPHEN AND CAFFEINE 50; 325; 40 MG/1; MG/1; MG/1
1-2 TABLET ORAL EVERY 4 HOURS PRN
Qty: 20 TABLET | Refills: 0 | Status: SHIPPED | OUTPATIENT
Start: 2022-06-02

## 2022-06-02 RX ORDER — PSEUDOEPHEDRINE HCL 30 MG
100 TABLET ORAL 2 TIMES DAILY
Qty: 60 CAPSULE | Refills: 0 | Status: SHIPPED | OUTPATIENT
Start: 2022-06-02

## 2022-06-02 RX ORDER — IBUPROFEN 600 MG/1
600 TABLET ORAL EVERY 6 HOURS
Qty: 30 TABLET | Refills: 0 | Status: SHIPPED | OUTPATIENT
Start: 2022-06-02

## 2022-06-02 RX ORDER — DOCUSATE SODIUM 100 MG/1
CAPSULE, LIQUID FILLED ORAL
Qty: 180 CAPSULE | Refills: 0 | OUTPATIENT
Start: 2022-06-02

## 2022-06-02 NOTE — PROGRESS NOTES
All discharge instructions reviewed with Pt and , both verbalize understanding of all instructions. ID bands matched x3. HUGS/KISSES removed. All questions answered. Teal band given.

## 2022-06-05 ENCOUNTER — TELEPHONE (OUTPATIENT)
Dept: OBGYN UNIT | Facility: HOSPITAL | Age: 32
End: 2022-06-05

## 2022-06-05 NOTE — PROGRESS NOTES
Reviewed self and infant care w / mom, she verbalizes understanding of instructions reviewed. Encourage to follow up w/ MDs as directed and w/ questions/concerns. Swollen feet and ankles, care reviewed, bp ok, denies ppd or bb but care reviewed.

## 2022-07-14 PROBLEM — Z34.90 PREGNANCY: Status: RESOLVED | Noted: 2022-05-31 | Resolved: 2022-07-14

## 2022-07-14 PROBLEM — Z34.90 SUPERVISION OF NORMAL PREGNANCY (HCC): Status: RESOLVED | Noted: 2022-03-17 | Resolved: 2022-07-14

## 2022-07-14 PROBLEM — Z34.90 SUPERVISION OF NORMAL PREGNANCY: Status: RESOLVED | Noted: 2022-03-17 | Resolved: 2022-07-14

## 2022-07-14 PROBLEM — Z34.90 PREGNANCY (HCC): Status: RESOLVED | Noted: 2022-05-31 | Resolved: 2022-07-14

## 2022-07-15 ENCOUNTER — POSTPARTUM (OUTPATIENT)
Dept: OBGYN CLINIC | Facility: CLINIC | Age: 32
End: 2022-07-15
Payer: COMMERCIAL

## 2022-07-15 VITALS
HEART RATE: 80 BPM | SYSTOLIC BLOOD PRESSURE: 112 MMHG | BODY MASS INDEX: 23.14 KG/M2 | DIASTOLIC BLOOD PRESSURE: 76 MMHG | WEIGHT: 137.19 LBS | HEIGHT: 64.5 IN

## 2022-07-15 DIAGNOSIS — F41.8 POSTPARTUM ANXIETY: ICD-10-CM

## 2022-07-15 DIAGNOSIS — Z12.4 SCREENING FOR CERVICAL CANCER: ICD-10-CM

## 2022-07-15 PROCEDURE — 88175 CYTOPATH C/V AUTO FLUID REDO: CPT | Performed by: OBSTETRICS & GYNECOLOGY

## 2022-09-20 ENCOUNTER — TELEPHONE (OUTPATIENT)
Dept: OBGYN CLINIC | Facility: CLINIC | Age: 32
End: 2022-09-20

## 2022-09-20 RX ORDER — AMOXICILLIN AND CLAVULANATE POTASSIUM 875; 125 MG/1; MG/1
1 TABLET, FILM COATED ORAL 2 TIMES DAILY
Qty: 20 TABLET | Refills: 0 | Status: SHIPPED | OUTPATIENT
Start: 2022-09-20

## 2022-09-20 NOTE — TELEPHONE ENCOUNTER
Patient is s/p  on 22  Postpartum visit on 7/15/22    Contacted patient. She reports breast lump, pain, fever and chills. S/s consistent with mastitis. Per protocol, Augmentin 875-125 mg 1 capsule po bid for 10 days, #20, no refills sent to pharmacy on file. Confirmed allergies and pharmacy location with patient prior to sending prescription. Patient instructed to call office if symptoms to not start to improve after taking medication for 48 hrs. Also instructed to call with any new, worsening, or persisting symptoms. Discussed using warm, moist compresses and massage toward nipple prior to feeds, and Haakaa pump with epsom salt water. Patient states understanding.

## 2023-10-19 ENCOUNTER — OFFICE VISIT (OUTPATIENT)
Dept: FAMILY MEDICINE CLINIC | Facility: CLINIC | Age: 33
End: 2023-10-19
Payer: COMMERCIAL

## 2023-10-19 VITALS
DIASTOLIC BLOOD PRESSURE: 80 MMHG | WEIGHT: 138.19 LBS | SYSTOLIC BLOOD PRESSURE: 114 MMHG | HEIGHT: 64 IN | HEART RATE: 86 BPM | RESPIRATION RATE: 16 BRPM | BODY MASS INDEX: 23.59 KG/M2 | OXYGEN SATURATION: 100 % | TEMPERATURE: 98 F

## 2023-10-19 DIAGNOSIS — Z80.3 FAMILY HISTORY OF BREAST CANCER IN MOTHER: ICD-10-CM

## 2023-10-19 DIAGNOSIS — Z13.6 SCREENING FOR CARDIOVASCULAR CONDITION: ICD-10-CM

## 2023-10-19 DIAGNOSIS — Z00.00 ANNUAL PHYSICAL EXAM: Primary | ICD-10-CM

## 2023-10-19 DIAGNOSIS — Z13.228 SCREENING FOR ENDOCRINE, METABOLIC, AND IMMUNITY DISORDER: ICD-10-CM

## 2023-10-19 DIAGNOSIS — Z13.29 SCREENING FOR ENDOCRINE, METABOLIC, AND IMMUNITY DISORDER: ICD-10-CM

## 2023-10-19 DIAGNOSIS — Z13.0 SCREENING FOR ENDOCRINE, METABOLIC, AND IMMUNITY DISORDER: ICD-10-CM

## 2023-10-19 PROCEDURE — 3008F BODY MASS INDEX DOCD: CPT | Performed by: FAMILY MEDICINE

## 2023-10-19 PROCEDURE — 3074F SYST BP LT 130 MM HG: CPT | Performed by: FAMILY MEDICINE

## 2023-10-19 PROCEDURE — 3079F DIAST BP 80-89 MM HG: CPT | Performed by: FAMILY MEDICINE

## 2023-10-19 PROCEDURE — 99385 PREV VISIT NEW AGE 18-39: CPT | Performed by: FAMILY MEDICINE

## 2023-10-19 RX ORDER — IBUPROFEN 600 MG/1
1 TABLET ORAL EVERY 6 HOURS PRN
COMMUNITY
Start: 2023-05-11

## 2023-11-02 ENCOUNTER — PATIENT MESSAGE (OUTPATIENT)
Dept: FAMILY MEDICINE CLINIC | Facility: CLINIC | Age: 33
End: 2023-11-02

## 2023-11-02 NOTE — TELEPHONE ENCOUNTER
PCP ordered TSH, Lipid panel, CMP, and CBC.     Orders faxed to [address] [place] [address].

## 2023-11-02 NOTE — TELEPHONE ENCOUNTER
From: Sushila Addison  To: Elizabeth Edmondson  Sent: 11/2/2023 11:21 AM CDT  Subject: Labwork    Hi there! I went to get routine lab work done today but the  told me the order only showed the thyroid labs. I was wondering if I could need another order for the rest of the labs, or if that was all that was needed. Thank you!

## 2023-11-03 LAB — TSH W/REFLEX TO FT4: 1.46 MIU/L

## 2023-11-06 DIAGNOSIS — Z13.29 SCREENING FOR ENDOCRINE, METABOLIC, AND IMMUNITY DISORDER: ICD-10-CM

## 2023-11-06 DIAGNOSIS — Z13.6 SCREENING FOR CARDIOVASCULAR CONDITION: ICD-10-CM

## 2023-11-06 DIAGNOSIS — Z13.0 SCREENING FOR ENDOCRINE, METABOLIC, AND IMMUNITY DISORDER: ICD-10-CM

## 2023-11-06 DIAGNOSIS — Z00.00 ANNUAL PHYSICAL EXAM: Primary | ICD-10-CM

## 2023-11-06 DIAGNOSIS — Z13.228 SCREENING FOR ENDOCRINE, METABOLIC, AND IMMUNITY DISORDER: ICD-10-CM

## 2023-11-10 LAB
ABSOLUTE BASOPHILS: 70 CELLS/UL (ref 0–200)
ABSOLUTE EOSINOPHILS: 122 CELLS/UL (ref 15–500)
ABSOLUTE LYMPHOCYTES: 2483 CELLS/UL (ref 850–3900)
ABSOLUTE MONOCYTES: 378 CELLS/UL (ref 200–950)
ABSOLUTE NEUTROPHILS: 3347 CELLS/UL (ref 1500–7800)
ALBUMIN/GLOBULIN RATIO: 1.6 (CALC) (ref 1–2.5)
ALBUMIN: 4.7 G/DL (ref 3.6–5.1)
ALKALINE PHOSPHATASE: 107 U/L (ref 31–125)
ALT: 116 U/L (ref 6–29)
AST: 52 U/L (ref 10–30)
BASOPHILS: 1.1 %
BILIRUBIN, TOTAL: 0.9 MG/DL (ref 0.2–1.2)
BUN: 13 MG/DL (ref 7–25)
CALCIUM: 9.6 MG/DL (ref 8.6–10.2)
CARBON DIOXIDE: 25 MMOL/L (ref 20–32)
CHLORIDE: 104 MMOL/L (ref 98–110)
CHOL/HDLC RATIO: 2.5 (CALC)
CHOLESTEROL, TOTAL: 152 MG/DL
CREATININE: 0.56 MG/DL (ref 0.5–0.97)
EGFR: 125 ML/MIN/1.73M2
EOSINOPHILS: 1.9 %
GLOBULIN: 3 G/DL (CALC) (ref 1.9–3.7)
GLUCOSE: 88 MG/DL (ref 65–99)
HDL CHOLESTEROL: 61 MG/DL
HEMATOCRIT: 44.2 % (ref 35–45)
HEMOGLOBIN: 14.9 G/DL (ref 11.7–15.5)
LDL-CHOLESTEROL: 76 MG/DL (CALC)
LYMPHOCYTES: 38.8 %
MCH: 31 PG (ref 27–33)
MCHC: 33.7 G/DL (ref 32–36)
MCV: 91.9 FL (ref 80–100)
MONOCYTES: 5.9 %
MPV: 9.6 FL (ref 7.5–12.5)
NEUTROPHILS: 52.3 %
NON-HDL CHOLESTEROL: 91 MG/DL (CALC)
PLATELET COUNT: 232 THOUSAND/UL (ref 140–400)
POTASSIUM: 3.9 MMOL/L (ref 3.5–5.3)
PROTEIN, TOTAL: 7.7 G/DL (ref 6.1–8.1)
RDW: 12.9 % (ref 11–15)
RED BLOOD CELL COUNT: 4.81 MILLION/UL (ref 3.8–5.1)
SODIUM: 139 MMOL/L (ref 135–146)
TRIGLYCERIDES: 69 MG/DL
WHITE BLOOD CELL COUNT: 6.4 THOUSAND/UL (ref 3.8–10.8)

## 2023-11-11 DIAGNOSIS — R79.89 ELEVATED LFTS: Primary | ICD-10-CM

## 2023-12-12 ENCOUNTER — PATIENT MESSAGE (OUTPATIENT)
Dept: FAMILY MEDICINE CLINIC | Facility: CLINIC | Age: 33
End: 2023-12-12

## 2023-12-12 NOTE — TELEPHONE ENCOUNTER
From: Saniya Look  To: Venkata Day  Sent: 12/12/2023 10:22 AM CST  Subject: Ultrasound    Hi there, the place I scheduled my ultrasound at is asking for a paper order for the procedure. I was wondering if I can get an order sent, so I can print it out at home, thank you in advance.

## 2023-12-28 ENCOUNTER — TELEPHONE (OUTPATIENT)
Dept: FAMILY MEDICINE CLINIC | Facility: CLINIC | Age: 33
End: 2023-12-28

## 2024-01-02 ENCOUNTER — MED REC SCAN ONLY (OUTPATIENT)
Dept: FAMILY MEDICINE CLINIC | Facility: CLINIC | Age: 34
End: 2024-01-02

## 2024-05-06 ENCOUNTER — TELEPHONE (OUTPATIENT)
Dept: FAMILY MEDICINE CLINIC | Facility: CLINIC | Age: 34
End: 2024-05-06

## 2024-05-06 ENCOUNTER — NURSE TRIAGE (OUTPATIENT)
Dept: FAMILY MEDICINE CLINIC | Facility: CLINIC | Age: 34
End: 2024-05-06

## 2024-05-06 NOTE — TELEPHONE ENCOUNTER
LMOM for patient regarding NO SHOW status for office visit on 05/06/2024 with Dr. Loraine garcia. Informed patient our office has a $40.00 NO SHOW FEE POLICY so we ask that you call at least 24 hours before your appt time. Informed patient can also use my-chart to cancel appt before 24 hours before your appointment. Patient will be charged $40.00 for any future NO SHOW appointments.

## 2024-05-06 NOTE — TELEPHONE ENCOUNTER
Reason for Disposition   Hives or itching    Answer Assessment - Initial Assessment Questions  1. APPEARANCE of RASH: \"Describe the rash.\" (e.g., spots, blisters, raised areas, skin peeling, scaly)      Spots, red and raised  2. SIZE: \"How big are the spots?\" (e.g., tip of pen, eraser, coin; inches, centimeters)      Bigger than a tip of a pen, the first ones she got in her elbow are now bigger than a Quarter and warm  3. LOCATION: \"Where is the rash located?\"      Started on left elbow, now right albow, arms, both leg, buttucks,   4. COLOR: \"What color is the rash?\" (Note: It is difficult to assess rash color in people with darker-colored skin. When this situation occurs, simply ask the caller to describe what they see.)      Red  5. ONSET: \"When did the rash begin?\"      Last night, took benadryl   6. FEVER: \"Do you have a fever?\" If Yes, ask: \"What is your temperature, how was it measured, and when did it start?\"      No fever  7. ITCHING: \"Does the rash itch?\" If Yes, ask: \"How bad is the itch?\" (Scale 1-10; or mild, moderate, severe)      Yes, 7-8 out of 10  8. CAUSE: \"What do you think is causing the rash?\"      Unknown  9. NEW MEDICATION: \"What new medication are you taking?\" (e.g., name of antibiotic) \"When did you start taking this medication?\".      Omeprazole, 11 days   10. OTHER SYMPTOMS: \"Do you have any other symptoms?\" (e.g., sore throat, fever, joint pain)        No  11. PREGNANCY: \"Is there any chance you are pregnant?\" \"When was your last menstrual period?\"        Not had a period since March, at home pregnancy test have been negative, not normal for her to skip periods    Protocols used: Rash - Widespread On Drugs-A-OH    Pt called, states she noticed a rash on left elbow. Pt took Benadryl last night, and the rash seemed to get better. However, this morning pt woke up with rash over legs, buttocks, and arms. States the rash on her body looks like red, raised skin, while the rash on her left elbow  looks like fluid filled blisters.  Reports those are a little bigger than a quarter.     Pt reports she started omeprazole 11 days ago, but has not had a reaction like this to it in the past.     Pt also reports she has not gotten a period since March. States at home pregnancy tests have been negative, but her period is usually pretty regular.     Based on s/s, offered appt with PCP. Pt accepted. Appt set for 5/6/2024 at 10 am.

## 2024-05-16 ENCOUNTER — EMPLOYEE HEALTH (OUTPATIENT)
Dept: OTHER | Facility: HOSPITAL | Age: 34
End: 2024-05-16
Attending: PREVENTIVE MEDICINE

## 2024-05-16 DIAGNOSIS — Z11.1 SCREENING-PULMONARY TB: Primary | ICD-10-CM

## 2024-05-16 DIAGNOSIS — Z01.84 IMMUNITY STATUS TESTING: ICD-10-CM

## 2024-05-16 PROCEDURE — 86787 VARICELLA-ZOSTER ANTIBODY: CPT

## 2024-05-16 PROCEDURE — 86480 TB TEST CELL IMMUN MEASURE: CPT

## 2024-05-17 LAB — VZV IGG SER IA-ACNC: 160.8 (ref 165–?)

## 2024-05-18 LAB
M TB IFN-G CD4+ T-CELLS BLD-ACNC: 0.03 IU/ML
M TB TUBERC IFN-G BLD QL: NEGATIVE
M TB TUBERC IGNF/MITOGEN IGNF CONTROL: >10 IU/ML
QFT TB1 AG MINUS NIL: 0 IU/ML
QFT TB2 AG MINUS NIL: 0 IU/ML

## 2025-02-27 ENCOUNTER — OFFICE VISIT (OUTPATIENT)
Dept: FAMILY MEDICINE CLINIC | Facility: CLINIC | Age: 35
End: 2025-02-27
Payer: COMMERCIAL

## 2025-02-27 VITALS
BODY MASS INDEX: 23.72 KG/M2 | HEIGHT: 64.6 IN | DIASTOLIC BLOOD PRESSURE: 80 MMHG | SYSTOLIC BLOOD PRESSURE: 116 MMHG | RESPIRATION RATE: 18 BRPM | TEMPERATURE: 98 F | WEIGHT: 140.63 LBS

## 2025-02-27 DIAGNOSIS — F41.9 ANXIETY: ICD-10-CM

## 2025-02-27 DIAGNOSIS — Z80.3 FAMILY HISTORY OF BREAST CANCER IN MOTHER: ICD-10-CM

## 2025-02-27 DIAGNOSIS — Z84.81 FAMILY HISTORY OF BRCA GENE POSITIVE: ICD-10-CM

## 2025-02-27 DIAGNOSIS — Z13.21 SCREENING FOR ENDOCRINE, NUTRITIONAL, METABOLIC AND IMMUNITY DISORDER: ICD-10-CM

## 2025-02-27 DIAGNOSIS — Z13.29 SCREENING FOR ENDOCRINE, NUTRITIONAL, METABOLIC AND IMMUNITY DISORDER: ICD-10-CM

## 2025-02-27 DIAGNOSIS — Z13.0 SCREENING FOR ENDOCRINE, NUTRITIONAL, METABOLIC AND IMMUNITY DISORDER: ICD-10-CM

## 2025-02-27 DIAGNOSIS — Z13.6 SCREENING FOR ISCHEMIC HEART DISEASE: ICD-10-CM

## 2025-02-27 DIAGNOSIS — R22.2 LUMP OF SKIN OF BACK: ICD-10-CM

## 2025-02-27 DIAGNOSIS — Z00.00 ANNUAL PHYSICAL EXAM: Primary | ICD-10-CM

## 2025-02-27 DIAGNOSIS — Z13.228 SCREENING FOR ENDOCRINE, NUTRITIONAL, METABOLIC AND IMMUNITY DISORDER: ICD-10-CM

## 2025-02-27 LAB
ALBUMIN SERPL-MCNC: 4.9 G/DL (ref 3.2–4.8)
ALBUMIN/GLOB SERPL: 1.8 {RATIO} (ref 1–2)
ALP LIVER SERPL-CCNC: 93 U/L
ALT SERPL-CCNC: 48 U/L
ANION GAP SERPL CALC-SCNC: 7 MMOL/L (ref 0–18)
AST SERPL-CCNC: 34 U/L (ref ?–34)
BASOPHILS # BLD AUTO: 0.05 X10(3) UL (ref 0–0.2)
BASOPHILS NFR BLD AUTO: 0.7 %
BILIRUB SERPL-MCNC: 0.8 MG/DL (ref 0.3–1.2)
BUN BLD-MCNC: 8 MG/DL (ref 9–23)
CALCIUM BLD-MCNC: 9.4 MG/DL (ref 8.7–10.6)
CHLORIDE SERPL-SCNC: 105 MMOL/L (ref 98–112)
CHOLEST SERPL-MCNC: 123 MG/DL (ref ?–200)
CO2 SERPL-SCNC: 27 MMOL/L (ref 21–32)
CREAT BLD-MCNC: 0.63 MG/DL
EGFRCR SERPLBLD CKD-EPI 2021: 121 ML/MIN/1.73M2 (ref 60–?)
EOSINOPHIL # BLD AUTO: 0.08 X10(3) UL (ref 0–0.7)
EOSINOPHIL NFR BLD AUTO: 1 %
ERYTHROCYTE [DISTWIDTH] IN BLOOD BY AUTOMATED COUNT: 12.3 %
FASTING PATIENT LIPID ANSWER: YES
FASTING STATUS PATIENT QL REPORTED: YES
GLOBULIN PLAS-MCNC: 2.7 G/DL (ref 2–3.5)
GLUCOSE BLD-MCNC: 87 MG/DL (ref 70–99)
HCT VFR BLD AUTO: 42.7 %
HDLC SERPL-MCNC: 50 MG/DL (ref 40–59)
HGB BLD-MCNC: 14.9 G/DL
IMM GRANULOCYTES # BLD AUTO: 0.03 X10(3) UL (ref 0–1)
IMM GRANULOCYTES NFR BLD: 0.4 %
LDLC SERPL CALC-MCNC: 63 MG/DL (ref ?–100)
LYMPHOCYTES # BLD AUTO: 2.23 X10(3) UL (ref 1–4)
LYMPHOCYTES NFR BLD AUTO: 29 %
MCH RBC QN AUTO: 30.1 PG (ref 26–34)
MCHC RBC AUTO-ENTMCNC: 34.9 G/DL (ref 31–37)
MCV RBC AUTO: 86.3 FL
MONOCYTES # BLD AUTO: 0.5 X10(3) UL (ref 0.1–1)
MONOCYTES NFR BLD AUTO: 6.5 %
NEUTROPHILS # BLD AUTO: 4.8 X10 (3) UL (ref 1.5–7.7)
NEUTROPHILS # BLD AUTO: 4.8 X10(3) UL (ref 1.5–7.7)
NEUTROPHILS NFR BLD AUTO: 62.4 %
NONHDLC SERPL-MCNC: 73 MG/DL (ref ?–130)
OSMOLALITY SERPL CALC.SUM OF ELEC: 286 MOSM/KG (ref 275–295)
PLATELET # BLD AUTO: 259 10(3)UL (ref 150–450)
POTASSIUM SERPL-SCNC: 4 MMOL/L (ref 3.5–5.1)
PROT SERPL-MCNC: 7.6 G/DL (ref 5.7–8.2)
RBC # BLD AUTO: 4.95 X10(6)UL
SODIUM SERPL-SCNC: 139 MMOL/L (ref 136–145)
TRIGL SERPL-MCNC: 36 MG/DL (ref 30–149)
TSI SER-ACNC: 1.24 UIU/ML (ref 0.55–4.78)
VLDLC SERPL CALC-MCNC: 5 MG/DL (ref 0–30)
WBC # BLD AUTO: 7.7 X10(3) UL (ref 4–11)

## 2025-02-27 PROCEDURE — 80061 LIPID PANEL: CPT | Performed by: FAMILY MEDICINE

## 2025-02-27 PROCEDURE — 85025 COMPLETE CBC W/AUTO DIFF WBC: CPT | Performed by: FAMILY MEDICINE

## 2025-02-27 PROCEDURE — 84443 ASSAY THYROID STIM HORMONE: CPT | Performed by: FAMILY MEDICINE

## 2025-02-27 PROCEDURE — 80053 COMPREHEN METABOLIC PANEL: CPT | Performed by: FAMILY MEDICINE

## 2025-02-27 PROCEDURE — 99395 PREV VISIT EST AGE 18-39: CPT | Performed by: FAMILY MEDICINE

## 2025-02-27 RX ORDER — HYDROXYZINE HYDROCHLORIDE 50 MG/1
50 TABLET, FILM COATED ORAL EVERY 6 HOURS PRN
Qty: 60 TABLET | Refills: 0 | Status: SHIPPED | OUTPATIENT
Start: 2025-02-27

## 2025-04-22 NOTE — PLAN OF CARE
Physician Progress Note      PATIENT:               ERNESTO CRUZ  [facility] #:                  [id number]  :                       1939  ADMIT DATE:       2025 8:57 PM  DISCH DATE:        2025 2:13 PM  RESPONDING  PROVIDER #:        UNSRAT MILNER          QUERY TEXT:    Acute respiratory failure is documented in the discharge summary.. Please   provide additional clinical indicators supportive of your documentation. Or   please document if the diagnosis of acute respiratory failure has been ruled   out after study.    The clinical indicators include:   ED provider note: \"85 y.o. male who presents for shortness of breath.    This is a gentleman who today according to daughter at bedside started   clasping his chest, he started complaining of shortness of breath...He was not   hypoxic but he was low on his oxygen when he came in.     ED RN: \"Pt 93% RA on arrival. Pt place on 1 L NC due to increase WOB.\"    H&P: \"86 y/o...presented to [facility] for complaints of shortness of breath. Per   report, he complained of SOB and a dry cough for a few days...Respiratory   distress, unclear etiology...S/p breathing treatment. Former smoker with some   hyperinflation of lungs and history of COPD. Likely COPD exacerbation. Denies   current smoking, no baseline O2. Check respiratory cultures, will de-escalate   antibiotics. May benefit from steroids...  -Shortness of breath secondary to CAP pneumonia: Reports dry cough past few   days, no production.  Denies any fevers chills.Chest x-ray showing hazy   infiltrate left lower lobe.  Will admit to hospital for IV antibiotics,   started on Zosyn in emergency department, will continue, blood cultures   pending.\"    Discharge summary:  \"-Mild AHRF, resolved  -Mild COPD exacerbation  -Discharged home with Z-Raheem, prednisone, and breathing treatments  Patient found to be mildly hypoxic requiring 2L NC initially. Initial concern   for pneumonia. COVID/influenza  Problem: Patient/Family Goals  Goal: Patient/Family Long Term Goal  Description  Uncomplicated vaginal delivery    Interventions:  VS per protocol  I&O  Ice chips and sips as tolerated  EFM per protocol  Maintain IV as ordered  Antibiotics as needed per with staff  Outcome: Progressing

## 2025-06-10 ENCOUNTER — HOSPITAL ENCOUNTER (EMERGENCY)
Facility: HOSPITAL | Age: 35
Discharge: HOME | End: 2025-06-10
Attending: EMERGENCY MEDICINE
Payer: COMMERCIAL

## 2025-06-10 ENCOUNTER — APPOINTMENT (OUTPATIENT)
Dept: GENERAL RADIOLOGY | Facility: HOSPITAL | Age: 35
End: 2025-06-10
Attending: EMERGENCY MEDICINE
Payer: COMMERCIAL

## 2025-06-10 VITALS
WEIGHT: 140 LBS | HEART RATE: 99 BPM | SYSTOLIC BLOOD PRESSURE: 112 MMHG | TEMPERATURE: 98 F | HEIGHT: 64 IN | RESPIRATION RATE: 14 BRPM | OXYGEN SATURATION: 100 % | BODY MASS INDEX: 23.9 KG/M2 | DIASTOLIC BLOOD PRESSURE: 66 MMHG

## 2025-06-10 DIAGNOSIS — S40.022A CONTUSION OF LEFT UPPER EXTREMITY, INITIAL ENCOUNTER: ICD-10-CM
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
## 2025-06-10 DIAGNOSIS — S80.12XA CONTUSION OF LEFT LOWER EXTREMITY, INITIAL ENCOUNTER: ICD-10-CM

## 2025-06-10 DIAGNOSIS — S20.212A CONTUSION OF LEFT CHEST WALL, INITIAL ENCOUNTER: ICD-10-CM

## 2025-06-10 DIAGNOSIS — T14.8XXA ABRASION: ICD-10-CM

## 2025-06-10 PROCEDURE — 73562 X-RAY EXAM OF KNEE 3: CPT | Performed by: EMERGENCY MEDICINE

## 2025-06-10 PROCEDURE — 96375 TX/PRO/DX INJ NEW DRUG ADDON: CPT

## 2025-06-10 PROCEDURE — 93010 ELECTROCARDIOGRAM REPORT: CPT

## 2025-06-10 PROCEDURE — 96374 THER/PROPH/DIAG INJ IV PUSH: CPT

## 2025-06-10 PROCEDURE — 71120 X-RAY EXAM BREASTBONE 2/>VWS: CPT | Performed by: EMERGENCY MEDICINE

## 2025-06-10 PROCEDURE — 99285 EMERGENCY DEPT VISIT HI MDM: CPT

## 2025-06-10 PROCEDURE — 73090 X-RAY EXAM OF FOREARM: CPT | Performed by: EMERGENCY MEDICINE

## 2025-06-10 PROCEDURE — 71101 X-RAY EXAM UNILAT RIBS/CHEST: CPT | Performed by: EMERGENCY MEDICINE

## 2025-06-10 PROCEDURE — 93005 ELECTROCARDIOGRAM TRACING: CPT

## 2025-06-10 RX ORDER — KETOROLAC TROMETHAMINE 15 MG/ML
15 INJECTION, SOLUTION INTRAMUSCULAR; INTRAVENOUS ONCE
Status: COMPLETED | OUTPATIENT
Start: 2025-06-10 | End: 2025-06-10

## 2025-06-10 RX ORDER — CYCLOBENZAPRINE HCL 10 MG
10 TABLET ORAL 3 TIMES DAILY PRN
Qty: 20 TABLET | Refills: 0 | Status: SHIPPED | OUTPATIENT
Start: 2025-06-10 | End: 2025-06-17

## 2025-06-10 RX ORDER — ONDANSETRON 2 MG/ML
4 INJECTION INTRAMUSCULAR; INTRAVENOUS ONCE
Status: COMPLETED | OUTPATIENT
Start: 2025-06-10 | End: 2025-06-10

## 2025-06-10 RX ORDER — HYDROMORPHONE HYDROCHLORIDE 1 MG/ML
0.5 INJECTION, SOLUTION INTRAMUSCULAR; INTRAVENOUS; SUBCUTANEOUS ONCE
Refills: 0 | Status: COMPLETED | OUTPATIENT
Start: 2025-06-10 | End: 2025-06-10

## 2025-06-10 RX ORDER — NAPROXEN 500 MG/1
500 TABLET ORAL 2 TIMES DAILY PRN
Qty: 20 TABLET | Refills: 0 | Status: SHIPPED | OUTPATIENT
Start: 2025-06-10
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
## 2025-06-11 LAB
ATRIAL RATE: 105 BPM
P AXIS: 54 DEGREES
P-R INTERVAL: 144 MS
Q-T INTERVAL: 364 MS
QRS DURATION: 80 MS
QTC CALCULATION (BEZET): 481 MS
R AXIS: 54 DEGREES
T AXIS: 21 DEGREES
VENTRICULAR RATE: 105 BPM

## 2025-06-16 ENCOUNTER — OFFICE VISIT (OUTPATIENT)
Dept: FAMILY MEDICINE CLINIC | Facility: CLINIC | Age: 35
End: 2025-06-16
Payer: COMMERCIAL

## 2025-06-16 VITALS
HEART RATE: 105 BPM | OXYGEN SATURATION: 99 % | RESPIRATION RATE: 18 BRPM | SYSTOLIC BLOOD PRESSURE: 114 MMHG | WEIGHT: 141 LBS | HEIGHT: 64 IN | DIASTOLIC BLOOD PRESSURE: 80 MMHG | TEMPERATURE: 98 F | BODY MASS INDEX: 24.07 KG/M2

## 2025-06-16 DIAGNOSIS — M79.89 RIGHT LEG SWELLING: ICD-10-CM
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
## 2025-06-16 DIAGNOSIS — M79.604 RIGHT LEG PAIN: ICD-10-CM

## 2025-06-16 DIAGNOSIS — M25.571 ACUTE RIGHT ANKLE PAIN: ICD-10-CM

## 2025-06-16 DIAGNOSIS — S80.12XS CONTUSION OF LEFT LOWER EXTREMITY, SEQUELA: ICD-10-CM

## 2025-06-16 RX ORDER — CETIRIZINE HYDROCHLORIDE 10 MG/1
10 TABLET ORAL DAILY
COMMUNITY
Start: 2024-05-06

## 2025-06-16 RX ORDER — IBUPROFEN 600 MG/1
600 TABLET, FILM COATED ORAL
COMMUNITY
Start: 2025-04-11

## 2025-06-16 RX ORDER — AMOXICILLIN 500 MG/1
500 CAPSULE ORAL 3 TIMES DAILY
COMMUNITY
Start: 2025-04-11

## 2025-06-16 RX ORDER — AMOXICILLIN 500 MG/1
500 TABLET, FILM COATED ORAL EVERY 8 HOURS
COMMUNITY
Start: 2025-04-04
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
## 2025-06-18 ENCOUNTER — APPOINTMENT (OUTPATIENT)
Dept: GENERAL RADIOLOGY | Age: 35
End: 2025-06-18
Attending: FAMILY MEDICINE
Payer: COMMERCIAL

## 2025-06-18 ENCOUNTER — HOSPITAL ENCOUNTER (OUTPATIENT)
Dept: ULTRASOUND IMAGING | Age: 35
Discharge: HOME | End: 2025-06-18
Attending: FAMILY MEDICINE
Payer: COMMERCIAL

## 2025-06-18 ENCOUNTER — TELEPHONE (OUTPATIENT)
Dept: FAMILY MEDICINE CLINIC | Facility: CLINIC | Age: 35
End: 2025-06-18

## 2025-06-18 DIAGNOSIS — M79.89 RIGHT LEG SWELLING: ICD-10-CM

## 2025-06-18 DIAGNOSIS — M79.604 RIGHT LEG PAIN: ICD-10-CM

## 2025-06-18 PROCEDURE — 93971 EXTREMITY STUDY: CPT | Performed by: FAMILY MEDICINE

## 2025-06-18 NOTE — TELEPHONE ENCOUNTER
[facility] called with STAT results   Impression   CONCLUSION:  No evidence of deep vein thrombus in the right lower extremity.   Routed to provider for review

## 2025-06-22 ENCOUNTER — HOSPITAL ENCOUNTER (OUTPATIENT)
Dept: GENERAL RADIOLOGY | Facility: HOSPITAL | Age: 35
Discharge: HOME | End: 2025-06-22
Attending: FAMILY MEDICINE
Payer: COMMERCIAL

## 2025-06-22 ENCOUNTER — HOSPITAL ENCOUNTER (OUTPATIENT)
Dept: GENERAL RADIOLOGY | Facility: HOSPITAL | Age: 35
End: 2025-06-22
Attending: FAMILY MEDICINE
Payer: COMMERCIAL

## 2025-06-22 DIAGNOSIS — M79.89 RIGHT LEG SWELLING: ICD-10-CM

## 2025-06-22 DIAGNOSIS — M79.604 RIGHT LEG PAIN: ICD-10-CM
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
## 2025-06-22 DIAGNOSIS — M25.571 ACUTE RIGHT ANKLE PAIN: ICD-10-CM

## 2025-06-22 PROCEDURE — 73610 X-RAY EXAM OF ANKLE: CPT | Performed by: FAMILY MEDICINE

## 2025-06-24 ENCOUNTER — PATIENT MESSAGE (OUTPATIENT)
Dept: FAMILY MEDICINE CLINIC | Facility: CLINIC | Age: 35
End: 2025-06-24

## (undated) NOTE — LETTER
12/05/19    To whom this may concern: This patient is currently pregnant with a due date of 2/8/20. As of 3 weeks prior to her due date (approximately 37 weeks), I would recommend she decrease her hours to part time.     Please contact me with any questi

## (undated) NOTE — LETTER
20    Re: Anastasia Avalos  : 10/25/1990    To Whom It May Concern:    Anastasia Avalos is under my care. She may return to work on 03/15/2020. If you have any questions concerning this letter, please feel free to contact my office.       Sincerely your

## (undated) NOTE — LETTER
Date: 6/16/2025    Patient Name: Abril Baugh          To Whom it may concern:    This letter has been written at the patient's request. The above patient was seen at [facility] for treatment of a medical condition.    This patient should be excused from attending work on 6/18/2025.    The patient may return to work on 6/20/2025 with the following limitations :  - Abril is to return to work on light duty  - no prolonged standing or walking  - no lifting/pushing/pulling greater than 10 lbs.    These restrictions are effective upon her return to shift on 6/20/2025 through 7/05/2025.    Should you have any questions please do not hesitate to contact my office.        Sincerely,      Federica Armstrong MD

## (undated) NOTE — LETTER
Elimra Baugh, :10/25/1990    CONSENT FOR PROCEDURE/SEDATION    1. I authorize the performance upon Lauren Giordano  the following: Intrauterine Device Mirena Insertion      2.  I authorize Dr. Rabia Glaser MD (and whomever is designated as the doctor’s assi Relationship to patient: ____________________________________________    Witness: _________________________________________ Date:___________     Physician Signature: _______________________________ Date:___________

## (undated) NOTE — LETTER
20    Re: Deepti Uribe  : 10/25/1990    To Whom It May Concern:    Deepti Uribe is under my care. She may return to work on 02/15/2020. If you have any questions concerning this letter, please feel free to contact my office.       Sincerely you

## (undated) NOTE — LETTER
12/10/19    To whom this may concern,    Dafne Zarate is a current patient in our practice. She is currently in the hospital with premature rupture of membranes and needs medical management until delivery.     If you have any questions or concerns please cont

## (undated) NOTE — LETTER
20    Re: Vicki Dave  : 10/25/1990      To Whom It May Concern:  Vicki Dave is under my care and has restrictions to lift no more than 15 lbs until further notice.  She should also be allow frequent rest breaks to avoid prolonged periods of sta

## (undated) NOTE — LETTER
20    Re: Chris Briscoe  : 10/25/1990    To Whom It May Concern:    Chris Briscoe is under my care for. She may return back to work on 02/15/2020. If you have any questions concerning this letter, please feel free to contact my office.       [facility]